# Patient Record
Sex: MALE | Race: WHITE | Employment: OTHER | URBAN - METROPOLITAN AREA
[De-identification: names, ages, dates, MRNs, and addresses within clinical notes are randomized per-mention and may not be internally consistent; named-entity substitution may affect disease eponyms.]

---

## 2019-09-17 ENCOUNTER — HOSPITAL ENCOUNTER (OUTPATIENT)
Age: 69
Discharge: HOME | End: 2019-09-19

## 2019-09-17 PROCEDURE — 88305 TISSUE EXAM BY PATHOLOGIST: CPT

## 2020-06-22 ENCOUNTER — APPOINTMENT (OUTPATIENT)
Dept: GENERAL RADIOLOGY | Age: 70
End: 2020-06-22
Payer: MEDICARE

## 2020-06-22 ENCOUNTER — HOSPITAL ENCOUNTER (OUTPATIENT)
Age: 70
Setting detail: OBSERVATION
Discharge: HOME | End: 2020-06-23
Attending: EMERGENCY MEDICINE | Admitting: INTERNAL MEDICINE
Payer: MEDICARE

## 2020-06-22 PROBLEM — I48.91 A-FIB (HCC): Status: ACTIVE | Noted: 2020-06-22

## 2020-06-22 LAB
ANION GAP SERPL CALCULATED.3IONS-SCNC: 11 MMOL/L (ref 7–16)
APTT: 27.9 SEC (ref 24.5–35.1)
BASOPHILS ABSOLUTE: 0.08 E9/L (ref 0–0.2)
BASOPHILS RELATIVE PERCENT: 0.9 % (ref 0–2)
BUN BLDV-MCNC: 23 MG/DL (ref 8–23)
CALCIUM SERPL-MCNC: 9.3 MG/DL (ref 8.6–10.2)
CHLORIDE BLD-SCNC: 105 MMOL/L (ref 98–107)
CO2: 25 MMOL/L (ref 22–29)
CREAT SERPL-MCNC: 1.3 MG/DL (ref 0.7–1.2)
EOSINOPHILS ABSOLUTE: 0.14 E9/L (ref 0.05–0.5)
EOSINOPHILS RELATIVE PERCENT: 1.5 % (ref 0–6)
GFR AFRICAN AMERICAN: >60
GFR NON-AFRICAN AMERICAN: 55 ML/MIN/1.73
GLUCOSE BLD-MCNC: 108 MG/DL (ref 74–99)
HCT VFR BLD CALC: 50 % (ref 37–54)
HEMOGLOBIN: 17.2 G/DL (ref 12.5–16.5)
IMMATURE GRANULOCYTES #: 0.03 E9/L
IMMATURE GRANULOCYTES %: 0.3 % (ref 0–5)
INR BLD: 1.1
LYMPHOCYTES ABSOLUTE: 1.71 E9/L (ref 1.5–4)
LYMPHOCYTES RELATIVE PERCENT: 18.9 % (ref 20–42)
MCH RBC QN AUTO: 29.9 PG (ref 26–35)
MCHC RBC AUTO-ENTMCNC: 34.4 % (ref 32–34.5)
MCV RBC AUTO: 86.8 FL (ref 80–99.9)
MONOCYTES ABSOLUTE: 0.69 E9/L (ref 0.1–0.95)
MONOCYTES RELATIVE PERCENT: 7.6 % (ref 2–12)
NEUTROPHILS ABSOLUTE: 6.42 E9/L (ref 1.8–7.3)
NEUTROPHILS RELATIVE PERCENT: 70.8 % (ref 43–80)
PDW BLD-RTO: 12.6 FL (ref 11.5–15)
PLATELET # BLD: 321 E9/L (ref 130–450)
PMV BLD AUTO: 10.4 FL (ref 7–12)
POTASSIUM REFLEX MAGNESIUM: 4.2 MMOL/L (ref 3.5–5)
PRO-BNP: 976 PG/ML (ref 0–125)
PROTHROMBIN TIME: 12.3 SEC (ref 9.3–12.4)
RBC # BLD: 5.76 E12/L (ref 3.8–5.8)
SODIUM BLD-SCNC: 141 MMOL/L (ref 132–146)
TROPONIN: <0.01 NG/ML (ref 0–0.03)
WBC # BLD: 9.1 E9/L (ref 4.5–11.5)

## 2020-06-22 PROCEDURE — 99285 EMERGENCY DEPT VISIT HI MDM: CPT

## 2020-06-22 PROCEDURE — 83880 ASSAY OF NATRIURETIC PEPTIDE: CPT

## 2020-06-22 PROCEDURE — 6370000000 HC RX 637 (ALT 250 FOR IP): Performed by: INTERNAL MEDICINE

## 2020-06-22 PROCEDURE — 2500000003 HC RX 250 WO HCPCS: Performed by: STUDENT IN AN ORGANIZED HEALTH CARE EDUCATION/TRAINING PROGRAM

## 2020-06-22 PROCEDURE — 6360000002 HC RX W HCPCS: Performed by: EMERGENCY MEDICINE

## 2020-06-22 PROCEDURE — 93005 ELECTROCARDIOGRAM TRACING: CPT | Performed by: EMERGENCY MEDICINE

## 2020-06-22 PROCEDURE — G0378 HOSPITAL OBSERVATION PER HR: HCPCS

## 2020-06-22 PROCEDURE — 71045 X-RAY EXAM CHEST 1 VIEW: CPT

## 2020-06-22 PROCEDURE — 94760 N-INVAS EAR/PLS OXIMETRY 1: CPT

## 2020-06-22 PROCEDURE — 84484 ASSAY OF TROPONIN QUANT: CPT

## 2020-06-22 PROCEDURE — 80048 BASIC METABOLIC PNL TOTAL CA: CPT

## 2020-06-22 PROCEDURE — 85730 THROMBOPLASTIN TIME PARTIAL: CPT

## 2020-06-22 PROCEDURE — 96374 THER/PROPH/DIAG INJ IV PUSH: CPT

## 2020-06-22 PROCEDURE — 85610 PROTHROMBIN TIME: CPT

## 2020-06-22 PROCEDURE — 2580000003 HC RX 258

## 2020-06-22 PROCEDURE — 85025 COMPLETE CBC W/AUTO DIFF WBC: CPT

## 2020-06-22 PROCEDURE — 96372 THER/PROPH/DIAG INJ SC/IM: CPT

## 2020-06-22 RX ORDER — RAMIPRIL 10 MG/1
10 CAPSULE ORAL 2 TIMES DAILY
COMMUNITY

## 2020-06-22 RX ORDER — METOPROLOL SUCCINATE 25 MG/1
25 TABLET, EXTENDED RELEASE ORAL DAILY
Status: DISCONTINUED | OUTPATIENT
Start: 2020-06-22 | End: 2020-06-23 | Stop reason: HOSPADM

## 2020-06-22 RX ORDER — DILTIAZEM HYDROCHLORIDE 5 MG/ML
10 INJECTION INTRAVENOUS ONCE
Status: COMPLETED | OUTPATIENT
Start: 2020-06-22 | End: 2020-06-22

## 2020-06-22 RX ORDER — FLECAINIDE ACETATE 50 MG/1
50 TABLET ORAL 2 TIMES DAILY
Status: DISCONTINUED | OUTPATIENT
Start: 2020-06-22 | End: 2020-06-22

## 2020-06-22 RX ORDER — AMLODIPINE BESYLATE 5 MG/1
5 TABLET ORAL DAILY
COMMUNITY

## 2020-06-22 RX ORDER — METOPROLOL SUCCINATE 25 MG/1
12.5 TABLET, EXTENDED RELEASE ORAL DAILY
Status: ON HOLD | COMMUNITY
End: 2020-06-23 | Stop reason: HOSPADM

## 2020-06-22 RX ORDER — FLECAINIDE ACETATE 100 MG/1
100 TABLET ORAL 2 TIMES DAILY
Status: DISCONTINUED | OUTPATIENT
Start: 2020-06-22 | End: 2020-06-23 | Stop reason: HOSPADM

## 2020-06-22 RX ORDER — FLECAINIDE ACETATE 50 MG/1
50 TABLET ORAL 2 TIMES DAILY
Status: ON HOLD | COMMUNITY
End: 2020-06-23 | Stop reason: HOSPADM

## 2020-06-22 RX ORDER — AMLODIPINE BESYLATE 5 MG/1
5 TABLET ORAL DAILY
Status: DISCONTINUED | OUTPATIENT
Start: 2020-06-22 | End: 2020-06-23 | Stop reason: HOSPADM

## 2020-06-22 RX ORDER — RAMIPRIL 5 MG/1
10 CAPSULE ORAL 2 TIMES DAILY
Status: DISCONTINUED | OUTPATIENT
Start: 2020-06-22 | End: 2020-06-23 | Stop reason: HOSPADM

## 2020-06-22 RX ORDER — SODIUM CHLORIDE 0.9 % (FLUSH) 0.9 %
SYRINGE (ML) INJECTION
Status: COMPLETED
Start: 2020-06-22 | End: 2020-06-22

## 2020-06-22 RX ADMIN — DILTIAZEM HYDROCHLORIDE 10 MG: 5 INJECTION INTRAVENOUS at 09:31

## 2020-06-22 RX ADMIN — ENOXAPARIN SODIUM 90 MG: 100 INJECTION SUBCUTANEOUS at 09:30

## 2020-06-22 RX ADMIN — FLECAINIDE ACETATE 100 MG: 100 TABLET ORAL at 20:28

## 2020-06-22 RX ADMIN — RAMIPRIL 10 MG: 5 CAPSULE ORAL at 19:37

## 2020-06-22 RX ADMIN — SODIUM CHLORIDE, PRESERVATIVE FREE 10 ML: 5 INJECTION INTRAVENOUS at 09:31

## 2020-06-22 ASSESSMENT — ENCOUNTER SYMPTOMS
COUGH: 0
CHOKING: 0
NAUSEA: 0
CHEST TIGHTNESS: 0
SINUS PRESSURE: 0
STRIDOR: 0
EYE PAIN: 0
ABDOMINAL PAIN: 0
SHORTNESS OF BREATH: 0
BACK PAIN: 0
EYE DISCHARGE: 0
ABDOMINAL DISTENTION: 0
VOMITING: 0
DIARRHEA: 0
SORE THROAT: 0
WHEEZING: 0
EYE REDNESS: 0

## 2020-06-22 ASSESSMENT — PAIN SCALES - GENERAL
PAINLEVEL_OUTOF10: 0

## 2020-06-22 NOTE — CONSULTS
Patient previously followed with The [address] [place] [address] and wishes to continue to follow with them. Have asked nursing staff to change cardiology consult to The [address] [place] [address]. [id number] [facility] signing off; please call again if needed.

## 2020-06-22 NOTE — ED PROVIDER NOTES
61-year-old male with a past medical history of A. fib RVR not on anticoagulation, hypertension on 4 medications presents with irregular heart rate. Patient states that for 1 week he has had intermittent heart palpitations. States that \"I feel like I have an irregular rhythm. \". This is not happened to me since 5 years prior where I was \"shocked back into rhythm. \"  Patient states that he was on Xarelto for 1 month after this time. He is not currently on any anticoagulation medication. Today he states that he started feeling nauseous and short of breath. No other associated symptoms. Denies any fevers, chills, nausea, vomiting, chest pain, shortness of breath, lightheadedness, vertigo, trauma, abdominal pain, flank pain, dysuria, hematuria, diarrhea, constipation and new rashes or sores. Review of Systems   Constitutional: Negative for activity change, chills and fever. HENT: Negative for congestion, ear pain, sinus pressure and sore throat. Eyes: Negative for pain, discharge and redness. Respiratory: Negative for cough, choking, chest tightness, shortness of breath, wheezing and stridor. Cardiovascular: Positive for palpitations. Negative for chest pain and leg swelling. Gastrointestinal: Negative for abdominal distention, abdominal pain, diarrhea, nausea and vomiting. Endocrine: Negative for polyphagia and polyuria. Genitourinary: Negative for decreased urine volume, dysuria, frequency and urgency. Musculoskeletal: Negative for arthralgias and back pain. Skin: Negative for rash and wound. Neurological: Negative for dizziness, tremors, seizures, syncope, facial asymmetry, speech difficulty, weakness, light-headedness, numbness and headaches. Hematological: Negative for adenopathy. Does not bruise/bleed easily. Psychiatric/Behavioral: Negative for agitation. All other systems reviewed and are negative. Physical Exam  Vitals signs and nursing note reviewed. oriented to person, place, and time. Cranial Nerves: No cranial nerve deficit. Coordination: Coordination normal.   Psychiatric:         Mood and Affect: Mood normal.         Behavior: Behavior normal.          Procedures     MDM  Number of Diagnoses or Management Options  Diagnosis management comments: 73-year-old male with a past medical year hypertension on 4 different medications presents with complaints of irregular heartbeat. Patient states that he has had previous episodes of A. fib RVR. States that \"they have never lasted this long. \"  Patient was in A. fib RVR in the department. He was given 10 mg of Cardizem with some relief. His heart rate prior to Cardizem was in the 130s now it is in the 90s. Patient still not complaining of any chest pain or shortness of breath. Patient was given Cardizem, Lovenox, and aspirin during evaluation. He will will be admitted to the hospital for his A. fib RVR. Patient was informed of all the results of his evaluation. Patient is agreeable with plan to stay. He has remained hemodynamically stable, throughout the entire stay. He has no new complaints. Dr. Jason Smith will admit patient to telemetry. ED Course as of Jun 22 1759 Mon Jun 22, 2020   [id number]  11/27/2014   Findings      Left Ventricle   Left ventricular size is grossly normal   LVEF 55-60%   no significant valve abnormalites   Left atrium and ADEBAYO without smoke or clot. Right Ventricle   Normal right ventricle structure and function. Left Atrium   Normal left atrium. Interatrial septum not well visualized. Right Atrium   Normal right atrium. Mitral Valve   Physiologic and/or trace mitral regurgitation is present. No evidence of hemodynamically significant mitral stenosis. Tricuspid Valve   Normal tricuspid valve structure and function. Aortic Valve   Normal aortic valve structure and function. Pulmonic Valve   Normal pulmonic valve structure and function.

## 2020-06-23 VITALS
DIASTOLIC BLOOD PRESSURE: 87 MMHG | RESPIRATION RATE: 18 BRPM | OXYGEN SATURATION: 94 % | WEIGHT: 200 LBS | HEART RATE: 81 BPM | HEIGHT: 68 IN | BODY MASS INDEX: 30.31 KG/M2 | TEMPERATURE: 97.8 F | SYSTOLIC BLOOD PRESSURE: 143 MMHG

## 2020-06-23 PROBLEM — N18.2 CHRONIC KIDNEY DISEASE, STAGE II (MILD): Status: ACTIVE | Noted: 2020-06-23

## 2020-06-23 LAB
EKG ATRIAL RATE: 288 BPM
EKG Q-T INTERVAL: 360 MS
EKG QRS DURATION: 130 MS
EKG QTC CALCULATION (BAZETT): 480 MS
EKG R AXIS: -85 DEGREES
EKG T AXIS: -123 DEGREES
EKG VENTRICULAR RATE: 107 BPM
LV EF: 55 %
LVEF MODALITY: NORMAL

## 2020-06-23 PROCEDURE — 93306 TTE W/DOPPLER COMPLETE: CPT

## 2020-06-23 PROCEDURE — G0378 HOSPITAL OBSERVATION PER HR: HCPCS

## 2020-06-23 PROCEDURE — 6370000000 HC RX 637 (ALT 250 FOR IP): Performed by: INTERNAL MEDICINE

## 2020-06-23 PROCEDURE — 93010 ELECTROCARDIOGRAM REPORT: CPT | Performed by: INTERNAL MEDICINE

## 2020-06-23 RX ORDER — FLECAINIDE ACETATE 100 MG/1
100 TABLET ORAL 2 TIMES DAILY
Qty: 60 TABLET | Refills: 3 | Status: SHIPPED | OUTPATIENT
Start: 2020-06-23

## 2020-06-23 RX ORDER — METOPROLOL SUCCINATE 25 MG/1
25 TABLET, EXTENDED RELEASE ORAL DAILY
Qty: 30 TABLET | Refills: 3 | Status: SHIPPED | OUTPATIENT
Start: 2020-06-24

## 2020-06-23 RX ADMIN — FLECAINIDE ACETATE 100 MG: 100 TABLET ORAL at 08:16

## 2020-06-23 RX ADMIN — RAMIPRIL 10 MG: 5 CAPSULE ORAL at 08:16

## 2020-06-23 RX ADMIN — METOPROLOL SUCCINATE 25 MG: 25 TABLET, EXTENDED RELEASE ORAL at 08:16

## 2020-06-23 RX ADMIN — AMLODIPINE BESYLATE 5 MG: 5 TABLET ORAL at 08:16

## 2020-06-23 ASSESSMENT — PAIN SCALES - GENERAL: PAINLEVEL_OUTOF10: 0

## 2020-06-23 NOTE — H&P
CHIEF COMPLAINT:  AF      HISTORY OF PRESENT ILLNESS:      The patient is a 81 y.o. male patient of Dr Darrin Jack who presents with the above. Remote hx of AF and cardioversion approx 2014  Basically, he has avoided CV f/u(his choice) and has done well. Now, with 1 week hx of AF he acknowledges. Past Medical History:    Past Medical History:   Diagnosis Date    Hypertension        Past Surgical History:    Past Surgical History:   Procedure Laterality Date    CARDIOVERSION  12/1/2014    TONSILLECTOMY         Medications Prior to Admission:    Medications Prior to Admission: ramipril (ALTACE) 10 MG capsule, Take 10 mg by mouth 2 times daily  metoprolol succinate (TOPROL XL) 25 MG extended release tablet, Take 12.5 mg by mouth daily  flecainide (TAMBOCOR) 50 MG tablet, Take 50 mg by mouth 2 times daily  amLODIPine (NORVASC) 5 MG tablet, Take 5 mg by mouth daily  aspirin 325 MG EC tablet, Take 325 mg by mouth daily Has been taking daily for past few days    Allergies:    Patient has no known allergies. Social History:    reports that he has never smoked. He has never used smokeless tobacco. He reports current alcohol use. He reports that he does not use drugs. Family History:   family history includes Colon Cancer in his father; Other in his mother. REVIEW OF SYSTEMS:  As above in the HPI, otherwise negative    PHYSICAL EXAM:    Vitals:  BP (!) 144/88   Pulse 96   Temp 97.4 °F (36.3 °C) (Infrared)   Resp 18   Ht 5' 8\" (1.727 m)   Wt 200 lb (90.7 kg)   SpO2 96%   BMI 30.41 kg/m²     General:  Awake, alert, oriented X 3. Well developed, well nourished, well groomed. No apparent distress. HEENT:  Normocephalic, atraumatic. Pupils equal, round, reactive to light. No scleral icterus. No conjunctival injection. Normal lips, teeth, and gums. No nasal discharge.   Neck:  Supple  Heart:  IRRR, no murmurs, gallops, rubs  Lungs:  CTA bilaterally, bilat symmetrical expansion, no wheeze, rales, or

## 2020-06-23 NOTE — CONSULTS
[facility] [address] [place] [address]    Name: Maxine Myles    Age: 81 y.o. Date of Admission: 6/22/2020  8:32 AM    Date of Service: 6/23/2020    Reason for Consultation: atrial fibrillation    Referring Physician: Dr Shala Bains  Primary Care Physician: Jozef Flanagan MD    History of Present Illness: The patient is a 81y.o. year old male with remote CV 2014 for symptomatic atrial fibrillation, last seen 2017- has been on flecainide 50 bid for rhythm suppression, now presenting for recurrent afib which he believes has been going on several days. Much less symptomatic , just felt a little more fatigue with physical labor. No palpitations lightheadedness, chest pain, orthopnea ,PND or dyspnea with normal activities. Has been reluctant to continue [facility] [place] [place] [facility]nd [facility] asa frequently. Past Medical History:   Past Medical History:   Diagnosis Date    Hypertension        Review of Systems:   Constitutional: No fever, chills, sweats  Cardiac: As per HPI  Pulmonary: No cough, wheeze, hemoptysis  HEENT: No visual disturbances, difficult swallowing  GI: No nausea, vomiting, diarrhea, abdominal pain, rectal bleeding  : No dysuria or hematuria  Endocrine: No excessive thirst, heat or cold intolerance. Musculoskeletal: No joint pain or muscle aches.  No claudication  Skin: No skin breakdown or rashes  Neuro: No headache, confusion, or seizures  Psych: No depression, anxiety    Family History:  Family History   Problem Relation Age of Onset    Other Mother         poor circulation     Colon Cancer Father        Social History:  Social History     Socioeconomic History    Marital status:      Spouse name: Not on file    Number of children: Not on file    Years of education: Not on file    Highest education level: Not on file   Occupational History    Not on file   Social Needs    Financial resource strain: Not on file    Food insecurity     Worry: Not on file     Inability: Not on file  Transportation needs     Medical: Not on file     Non-medical: Not on file   Tobacco Use    Smoking status: Never Smoker    Smokeless tobacco: Never Used   Substance and Sexual Activity    Alcohol use: Yes     Comment: social only    Drug use: No    Sexual activity: Not on file   Lifestyle    Physical activity     Days per week: Not on file     Minutes per session: Not on file    Stress: Not on file   Relationships    Social connections     Talks on phone: Not on file     Gets together: Not on file     Attends [employer] service: Not on file     Active member of club or organization: Not on file     Attends meetings of clubs or organizations: Not on file     Relationship status: Not on file    Intimate partner violence     Fear of current or ex partner: Not on file     Emotionally abused: Not on file     Physically abused: Not on file     Forced sexual activity: Not on file   Other Topics Concern    Not on file   Social History Narrative    Not on file       Allergies:  No Known Allergies    Home Medications:  Prior to Admission medications    Medication Sig Start Date End Date Taking?  Authorizing Provider   ramipril (ALTACE) 10 MG capsule Take 10 mg by mouth 2 times daily   Yes Historical Provider, MD   metoprolol succinate (TOPROL XL) 25 MG extended release tablet Take 12.5 mg by mouth daily   Yes Historical Provider, MD   flecainide (TAMBOCOR) 50 MG tablet Take 50 mg by mouth 2 times daily   Yes Historical Provider, MD   amLODIPine (NORVASC) 5 MG tablet Take 5 mg by mouth daily   Yes Historical Provider, MD   aspirin 325 MG EC tablet Take 325 mg by mouth daily Has been taking daily for past few days   Yes Historical Provider, MD       Current Medications:  Current Facility-Administered Medications   Medication Dose Route Frequency Provider Last Rate Last Dose    rivaroxaban (XARELTO) tablet 20 mg  20 mg Oral Daily Jessica Paniagua MD        perflutren lipid microspheres (DEFINITY) injection 1.65

## 2020-06-23 NOTE — CARE COORDINATION
CM met with pt & wife to discuss role, anticipated LOS & current plan of care. Reports living in split level with his wife. Is independent & does not use ADs or O2 at home. Is not diabetic ---states he is healthy. No hx BABATUNDE or HHC. Discussed dx, echo & TX plan. States he plans to return home with his wife. Denies having any needs at this time. Declines HHC. Nurse entered room to inform pt he is being discharged. Wife will be transporting pt home.

## 2020-06-23 NOTE — PLAN OF CARE
injury  6/23/2020 1251 by Fernando Mcmillan RN  Outcome: Met This Shift  6/23/2020 0008 by Ling Ribeiro RN  Outcome: Met This Shift

## 2020-07-27 ENCOUNTER — HOSPITAL ENCOUNTER (OUTPATIENT)
Age: 70
Discharge: HOME | End: 2020-07-29
Payer: MEDICARE

## 2020-07-27 PROCEDURE — U0003 INFECTIOUS AGENT DETECTION BY NUCLEIC ACID (DNA OR RNA); SEVERE ACUTE RESPIRATORY SYNDROME CORONAVIRUS 2 (SARS-COV-2) (CORONAVIRUS DISEASE [COVID-19]), AMPLIFIED PROBE TECHNIQUE, MAKING USE OF HIGH THROUGHPUT TECHNOLOGIES AS DESCRIBED BY CMS-2020-01-R: HCPCS

## 2020-07-28 LAB
SARS-COV-2: NOT DETECTED
SOURCE: NORMAL

## 2020-07-30 NOTE — PROGRESS NOTES
Have you been tested for COVID  Yes         Have you been told you were positive for COVID No  Have you had any known exposure to someone that is positive for COVID No  Do you have a cough                   No              Do you have shortness of breath No                 Do you have a sore throat            No                Are you having chills                    No                Are you having muscle aches. No                    Please come to the hospital wearing a mask and have your significant other wear a mask as well. Both of you should check your temperature before leaving to come here,  if it is 100 or higher please call [phone] for instruction.

## 2020-07-30 NOTE — PROGRESS NOTES
[facility] PRE-ADMISSION TESTING INSTRUCTIONS    The Preadmission Testing patient is instructed accordingly using the following criteria (check applicable):    ARRIVAL INSTRUCTIONS:  [x] Parking the day of Surgery is located in the Main Entrance lot. Upon entering the door, make an immediate right to the surgery reception desk    [] [phone] is available Monday through Friday 6 am to 6 pm    [x] Bring photo ID and insurance card    [] Bring in a copy of Living will or Durable Power of  papers. [x] Please be sure to arrange for responsible adult to provide transportation to and from the hospital    [x] Please arrange for responsible adult to be with you for the 24 hour period post procedure due to having anesthesia      GENERAL INSTRUCTIONS:    [x] Nothing by mouth after midnight, including gum, candy, mints or water    [x] You may brush your teeth, but do not swallow any water    [x] Take medications as instructed with 1-2 oz of water    [] Stop herbal supplements and vitamins 5 days prior to procedure    [x] Follow preop dosing of blood thinners per physician instructions    [] Take 1/2 dose of evening insulin, but no insulin after midnight    [] No oral diabetic medications after midnight    [] If diabetic and have low blood sugar or feel symptomatic, take 1-2oz apple juice only    [] Bring inhalers day of surgery    [] Bring C-PAP/ Bi-Pap day of surgery    [] Bring urine specimen day of surgery    [x] Shower or bath with soap, lather and rinse well, AM of Surgery, no lotion, powders or creams to surgical site    [] Follow bowel prep as instructed per surgeon    [x] No tobacco products within 24 hours of surgery     [x] No alcohol or illegal drug use within 24 hours of surgery.     [x] Jewelry, body piercing's, eyeglasses, contact lenses and dentures are not permitted into surgery (bring cases)      [x] Please do not wear any nail polish, make up or hair products on the day of surgery    [x] If not already done, you can expect a call from registration    [x] You can expect a call the business day prior to procedure to notify you if your arrival time changes    [x] If you receive a survey after surgery we would greatly appreciate your comments    [] Parent/guardian of a minor must accompany their child and remain on the premises  the entire time they are under our care     [] Pediatric patients may bring favorite toy, blanket or comfort item with them    [] A caregiver or family member must remain with the patient during their stay if they are mentally handicapped, have dementia, disoriented or unable to use a call light or would be a safety concern if left unattended    [x] Please notify surgeon if you develop any illness between now and time of surgery (cold, cough, sore throat, fever, nausea, vomiting) or any signs of infections  including skin, wounds, and dental.    []  The Outpatient Pharmacy is available to fill your prescription here on your day of surgery, ask your preop nurse for details    [x] Other instructions: wear comfortable clothing; patient instructed to follow all medication instructions from cardiology  EDUCATIONAL MATERIALS PROVIDED:    [] PAT Preoperative Education Packet/Booklet     [] Medication List    [] Fluoroscopy Information Pamphlet    [] Transfusion bracelet applied with instructions    [] Joint replacement video reviewed    [] Shower with soap, lather and rinse well, and use CHG wipes provided the evening before surgery as instructed

## 2020-07-31 ENCOUNTER — ANESTHESIA EVENT (OUTPATIENT)
Dept: INPATIENT UNIT | Age: 70
End: 2020-07-31

## 2020-07-31 ENCOUNTER — ANESTHESIA (OUTPATIENT)
Dept: INPATIENT UNIT | Age: 70
End: 2020-07-31

## 2020-07-31 ENCOUNTER — HOSPITAL ENCOUNTER (OUTPATIENT)
Dept: INPATIENT UNIT | Age: 70
Setting detail: OUTPATIENT SURGERY
Discharge: HOME | End: 2020-07-31
Payer: MEDICARE

## 2020-07-31 VITALS
OXYGEN SATURATION: 100 % | DIASTOLIC BLOOD PRESSURE: 66 MMHG | BODY MASS INDEX: 28.04 KG/M2 | HEART RATE: 56 BPM | SYSTOLIC BLOOD PRESSURE: 103 MMHG | HEIGHT: 68 IN | WEIGHT: 185 LBS | RESPIRATION RATE: 16 BRPM

## 2020-07-31 VITALS
DIASTOLIC BLOOD PRESSURE: 58 MMHG | OXYGEN SATURATION: 99 % | RESPIRATION RATE: 19 BRPM | SYSTOLIC BLOOD PRESSURE: 83 MMHG

## 2020-07-31 PROCEDURE — 93005 ELECTROCARDIOGRAM TRACING: CPT

## 2020-07-31 PROCEDURE — 3700000001 HC ADD 15 MINUTES (ANESTHESIA)

## 2020-07-31 PROCEDURE — 6360000002 HC RX W HCPCS: Performed by: NURSE ANESTHETIST, CERTIFIED REGISTERED

## 2020-07-31 PROCEDURE — 7100000011 HC PHASE II RECOVERY - ADDTL 15 MIN

## 2020-07-31 PROCEDURE — 3700000000 HC ANESTHESIA ATTENDED CARE

## 2020-07-31 PROCEDURE — 92960 CARDIOVERSION ELECTRIC EXT: CPT

## 2020-07-31 PROCEDURE — 2580000003 HC RX 258: Performed by: NURSE ANESTHETIST, CERTIFIED REGISTERED

## 2020-07-31 PROCEDURE — 7100000010 HC PHASE II RECOVERY - FIRST 15 MIN

## 2020-07-31 RX ORDER — PROPOFOL 10 MG/ML
INJECTION, EMULSION INTRAVENOUS PRN
Status: DISCONTINUED | OUTPATIENT
Start: 2020-07-31 | End: 2020-07-31 | Stop reason: SDUPTHER

## 2020-07-31 RX ORDER — SODIUM CHLORIDE 9 MG/ML
INJECTION, SOLUTION INTRAVENOUS CONTINUOUS PRN
Status: DISCONTINUED | OUTPATIENT
Start: 2020-07-31 | End: 2020-07-31 | Stop reason: SDUPTHER

## 2020-07-31 RX ADMIN — PROPOFOL 100 MG: 10 INJECTION, EMULSION INTRAVENOUS at 09:52

## 2020-07-31 RX ADMIN — SODIUM CHLORIDE: 9 INJECTION, SOLUTION INTRAVENOUS at 09:46

## 2020-07-31 ASSESSMENT — PAIN SCALES - GENERAL
PAINLEVEL_OUTOF10: 0
PAINLEVEL_OUTOF10: 0

## 2020-07-31 ASSESSMENT — PAIN - FUNCTIONAL ASSESSMENT: PAIN_FUNCTIONAL_ASSESSMENT: 0-10

## 2020-07-31 NOTE — ANESTHESIA PRE PROCEDURE
Department of Anesthesiology  Preprocedure Note       Name:  Marion Archer. Age:  81 y.o.  :  1950                                          MRN:  [id number]         Date:  2020      Surgeon: * No surgeons listed *    Procedure: * No procedures listed *    Medications prior to admission:   Prior to Admission medications    Medication Sig Start Date End Date Taking? Authorizing Provider   flecainide (TAMBOCOR) 100 MG tablet Take 1 tablet by mouth 2 times daily 20  Yes Asmita Vernon MD   rivaroxaban (XARELTO) 20 MG TABS tablet Take 1 tablet by mouth daily 20  Yes Asmita Vernon MD   metoprolol succinate (TOPROL XL) 25 MG extended release tablet Take 1 tablet by mouth daily 20  Yes Asmita Vernon MD   ramipril (ALTACE) 10 MG capsule Take 10 mg by mouth 2 times daily   Yes Historical Provider, MD   amLODIPine (NORVASC) 5 MG tablet Take 5 mg by mouth daily   Yes Historical Provider, MD       Current medications:    Current Outpatient Medications   Medication Sig Dispense Refill    flecainide (TAMBOCOR) 100 MG tablet Take 1 tablet by mouth 2 times daily 60 tablet 3    rivaroxaban (XARELTO) 20 MG TABS tablet Take 1 tablet by mouth daily 30 tablet 2    metoprolol succinate (TOPROL XL) 25 MG extended release tablet Take 1 tablet by mouth daily 30 tablet 3    ramipril (ALTACE) 10 MG capsule Take 10 mg by mouth 2 times daily      amLODIPine (NORVASC) 5 MG tablet Take 5 mg by mouth daily       No current facility-administered medications for this encounter.         Allergies:  No Known Allergies    Problem List:    Patient Active Problem List   Diagnosis Code    AF (atrial fibrillation) (HCC) I48.91    HTN (hypertension) I10    Abnormal EKG R94.31    A-fib ([facility]) I48.91    Chronic kidney disease, stage II (mild) N18.2       Past Medical History:        Diagnosis Date    A-fib ([facility] [address]     follows with Dr. Adriana Kapoor Environmental allergies     Hypertension        Past Surgical History: Procedure Laterality Date    CARDIOVERSION  12/01/2014    x2    TONSILLECTOMY         Social History:    Social History     Tobacco Use    Smoking status: Never Smoker    Smokeless tobacco: Never Used   Substance Use Topics    Alcohol use: Yes     Comment: social only                                Counseling given: Not Answered      Vital Signs (Current):   Vitals:    07/30/20 0909 07/31/20 0816   Pulse:  96   Resp:  16   SpO2:  97%   Weight: 195 lb (88.5 kg) 185 lb (83.9 kg)   Height: 5' 8\" (1.727 m) 5' 8\" (1.727 m)                                              BP Readings from Last 3 Encounters:   06/23/20 (!) 143/87       NPO Status:                                                                                 BMI:   Wt Readings from Last 3 Encounters:   07/31/20 185 lb (83.9 kg)   06/22/20 200 lb (90.7 kg)     Body mass index is 28.13 kg/m². CBC:   Lab Results   Component Value Date    WBC 9.1 06/22/2020    RBC 5.76 06/22/2020    HGB 17.2 06/22/2020    HCT 50.0 06/22/2020    MCV 86.8 06/22/2020    RDW 12.6 06/22/2020     06/22/2020       CMP:   Lab Results   Component Value Date     06/22/2020    K 4.2 06/22/2020     06/22/2020    CO2 25 06/22/2020    BUN 23 06/22/2020    CREATININE 1.3 06/22/2020    GFRAA >60 06/22/2020    LABGLOM 55 06/22/2020    GLUCOSE 108 06/22/2020    CALCIUM 9.3 06/22/2020       POC Tests: No results for input(s): POCGLU, POCNA, POCK, POCCL, POCBUN, POCHEMO, POCHCT in the last 72 hours.     Coags:   Lab Results   Component Value Date    PROTIME 12.3 06/22/2020    INR 1.1 06/22/2020    APTT 27.9 06/22/2020       HCG (If Applicable): No results found for: PREGTESTUR, PREGSERUM, HCG, HCGQUANT     ABGs: No results found for: PHART, PO2ART, IBC5XPR, JXQ5HXA, BEART, L7XPABXE     Type & Screen (If Applicable):  No results found for: LABABO, LABRH    Drug/Infectious Status (If Applicable):  No results found for: HIV, HEPCAB    COVID-19 Screening (If Applicable):

## 2020-07-31 NOTE — PROCEDURES
CARDIOVERSION REPORT     CARDIOLOGIST: Prieto Rodriguez M.D. REFERRING PHYSICIAN: Dr. Kait Casarez:  Elective DC Electrical Cardioversion    CURRENT MEDICATIONS PRIOR TO ENCOUNTER:  Current Outpatient Medications   Medication Sig Dispense Refill    flecainide (TAMBOCOR) 100 MG tablet Take 1 tablet by mouth 2 times daily 60 tablet 3    rivaroxaban (XARELTO) 20 MG TABS tablet Take 1 tablet by mouth daily 30 tablet 2    metoprolol succinate (TOPROL XL) 25 MG extended release tablet Take 1 tablet by mouth daily 30 tablet 3    ramipril (ALTACE) 10 MG capsule Take 10 mg by mouth 2 times daily      amLODIPine (NORVASC) 5 MG tablet Take 5 mg by mouth daily       No current facility-administered medications for this encounter. HISTORY: 68-year-old male with recurrent, symptomatic and persistent atrial flutter. Metoprolol XL and flecainide doses recently increased. Has been on oral anticoagulant therapy for over 1 month. Tentatively scheduled for evaluation for RF ablation      Consent:  Patient agrees to the above procedure after being advised of risks, benefits and alternatives. Risks were outlined including but not limited to death, stroke, respiratory failure, need for intubation, need for ACLS/pacemaker,  chest wall and skin discomfort. Procedure:  Patient was taken to the procedure room where intravenous access was established, and supplemental oxygen was provided. Anterior-posterior patches were affixed to the chest wall and SPO2 monitor/intermittent blood pressure recording device was initiated. Propofol administered per anesthesia and following adequate deep sedation, a single 150 J synchronized biphasic shock converted atrial flutter to sinus bradycardia and he recovered uneventfully. Will decrease metoprolol XL to 25 mg daily however all other meds remain unchanged. Follow-up appointment in August.    RESULTS:    COMPLICATIONS: None.   Patient will be discharged to home after being advised of skin care, diet, activity and medications. A follow-up appointment will be arranged.     CONCLUSION:  Successful electrical cardioversion of atrial flutter    Electronically signed by Yehuda Santiago MD on 7/31/2020 at 10:08 AM

## 2020-07-31 NOTE — ANESTHESIA POSTPROCEDURE EVALUATION
Department of Anesthesiology  Postprocedure Note    Patient: Brandi Mooney. MRN: [id number]  YOB: 1950  Date of evaluation: 7/31/2020  Time:  3:31 PM     Procedure Summary     Date:  07/31/20 Room / Location:  [facility] Stage I    Anesthesia Start:  0946 Anesthesia Stop:  1007    Procedure:  CARDIOVERSION Diagnosis:      Scheduled Providers:   Responsible Provider:  Jett Davis MD    Anesthesia Type:  MAC ASA Status:  3          Anesthesia Type: MAC    Yolanda Phase I:      Yolanda Phase II: Yolanda Score: 10    Last vitals: Reviewed and per EMR flowsheets.        Anesthesia Post Evaluation    Patient location during evaluation: PACU  Level of consciousness: awake  Airway patency: patent  Nausea & Vomiting: no nausea and no vomiting  Complications: no  Cardiovascular status: hemodynamically stable  Respiratory status: acceptable

## 2020-08-03 LAB
EKG ATRIAL RATE: 52 BPM
EKG P AXIS: 60 DEGREES
EKG P-R INTERVAL: 288 MS
EKG Q-T INTERVAL: 502 MS
EKG QRS DURATION: 114 MS
EKG QTC CALCULATION (BAZETT): 466 MS
EKG R AXIS: -75 DEGREES
EKG T AXIS: -127 DEGREES
EKG VENTRICULAR RATE: 52 BPM

## 2021-03-10 ENCOUNTER — APPOINTMENT (OUTPATIENT)
Dept: CT IMAGING | Age: 71
End: 2021-03-10
Payer: MEDICARE

## 2021-03-10 ENCOUNTER — HOSPITAL ENCOUNTER (EMERGENCY)
Age: 71
Discharge: HOME | End: 2021-03-10
Attending: EMERGENCY MEDICINE
Payer: MEDICARE

## 2021-03-10 VITALS
DIASTOLIC BLOOD PRESSURE: 84 MMHG | BODY MASS INDEX: 29.55 KG/M2 | TEMPERATURE: 98.7 F | HEIGHT: 68 IN | SYSTOLIC BLOOD PRESSURE: 153 MMHG | WEIGHT: 195 LBS | HEART RATE: 66 BPM | RESPIRATION RATE: 16 BRPM | OXYGEN SATURATION: 96 %

## 2021-03-10 DIAGNOSIS — R31.9 URINARY TRACT INFECTION WITH HEMATURIA, SITE UNSPECIFIED: ICD-10-CM

## 2021-03-10 DIAGNOSIS — N20.0 KIDNEY STONE: Primary | ICD-10-CM

## 2021-03-10 DIAGNOSIS — N39.0 URINARY TRACT INFECTION WITH HEMATURIA, SITE UNSPECIFIED: ICD-10-CM

## 2021-03-10 DIAGNOSIS — N23 RENAL COLIC: ICD-10-CM

## 2021-03-10 LAB
ALBUMIN SERPL-MCNC: 4.4 G/DL (ref 3.5–5.2)
ALP BLD-CCNC: 98 U/L (ref 40–129)
ALT SERPL-CCNC: 12 U/L (ref 0–40)
ANION GAP SERPL CALCULATED.3IONS-SCNC: 8 MMOL/L (ref 7–16)
AST SERPL-CCNC: 16 U/L (ref 0–39)
BACTERIA: ABNORMAL /HPF
BASOPHILS ABSOLUTE: 0.06 E9/L (ref 0–0.2)
BASOPHILS RELATIVE PERCENT: 0.9 % (ref 0–2)
BILIRUB SERPL-MCNC: 0.4 MG/DL (ref 0–1.2)
BILIRUBIN URINE: ABNORMAL
BLOOD, URINE: ABNORMAL
BUN BLDV-MCNC: 23 MG/DL (ref 8–23)
CALCIUM SERPL-MCNC: 9.5 MG/DL (ref 8.6–10.2)
CHLORIDE BLD-SCNC: 106 MMOL/L (ref 98–107)
CLARITY: ABNORMAL
CO2: 29 MMOL/L (ref 22–29)
COLOR: ABNORMAL
CREAT SERPL-MCNC: 1.3 MG/DL (ref 0.7–1.2)
EOSINOPHILS ABSOLUTE: 0.09 E9/L (ref 0.05–0.5)
EOSINOPHILS RELATIVE PERCENT: 1.3 % (ref 0–6)
GFR AFRICAN AMERICAN: >60
GFR NON-AFRICAN AMERICAN: 55 ML/MIN/1.73
GLUCOSE BLD-MCNC: 122 MG/DL (ref 74–99)
GLUCOSE URINE: NEGATIVE MG/DL
HCT VFR BLD CALC: 46 % (ref 37–54)
HEMOGLOBIN: 15.2 G/DL (ref 12.5–16.5)
IMMATURE GRANULOCYTES #: 0.02 E9/L
IMMATURE GRANULOCYTES %: 0.3 % (ref 0–5)
KETONES, URINE: ABNORMAL MG/DL
LEUKOCYTE ESTERASE, URINE: ABNORMAL
LYMPHOCYTES ABSOLUTE: 0.95 E9/L (ref 1.5–4)
LYMPHOCYTES RELATIVE PERCENT: 13.6 % (ref 20–42)
MCH RBC QN AUTO: 29.3 PG (ref 26–35)
MCHC RBC AUTO-ENTMCNC: 33 % (ref 32–34.5)
MCV RBC AUTO: 88.6 FL (ref 80–99.9)
MONOCYTES ABSOLUTE: 0.59 E9/L (ref 0.1–0.95)
MONOCYTES RELATIVE PERCENT: 8.4 % (ref 2–12)
NEUTROPHILS ABSOLUTE: 5.28 E9/L (ref 1.8–7.3)
NEUTROPHILS RELATIVE PERCENT: 75.5 % (ref 43–80)
NITRITE, URINE: POSITIVE
PDW BLD-RTO: 12.7 FL (ref 11.5–15)
PH UA: 5 (ref 5–9)
PLATELET # BLD: 276 E9/L (ref 130–450)
PMV BLD AUTO: 11 FL (ref 7–12)
POTASSIUM SERPL-SCNC: 5.1 MMOL/L (ref 3.5–5)
PROTEIN UA: >=300 MG/DL
RBC # BLD: 5.19 E12/L (ref 3.8–5.8)
RBC UA: >20 /HPF (ref 0–2)
SODIUM BLD-SCNC: 143 MMOL/L (ref 132–146)
SPECIFIC GRAVITY UA: >=1.03 (ref 1–1.03)
TOTAL PROTEIN: 7.1 G/DL (ref 6.4–8.3)
UROBILINOGEN, URINE: 1 E.U./DL
WBC # BLD: 7 E9/L (ref 4.5–11.5)
WBC UA: ABNORMAL /HPF (ref 0–5)

## 2021-03-10 PROCEDURE — 85025 COMPLETE CBC W/AUTO DIFF WBC: CPT

## 2021-03-10 PROCEDURE — 80053 COMPREHEN METABOLIC PANEL: CPT

## 2021-03-10 PROCEDURE — 81001 URINALYSIS AUTO W/SCOPE: CPT

## 2021-03-10 PROCEDURE — 99285 EMERGENCY DEPT VISIT HI MDM: CPT

## 2021-03-10 PROCEDURE — 2580000003 HC RX 258: Performed by: NURSE PRACTITIONER

## 2021-03-10 PROCEDURE — 74176 CT ABD & PELVIS W/O CONTRAST: CPT

## 2021-03-10 PROCEDURE — 6370000000 HC RX 637 (ALT 250 FOR IP): Performed by: NURSE PRACTITIONER

## 2021-03-10 RX ORDER — TAMSULOSIN HYDROCHLORIDE 0.4 MG/1
0.4 CAPSULE ORAL DAILY
Qty: 14 CAPSULE | Refills: 0 | Status: SHIPPED | OUTPATIENT
Start: 2021-03-10 | End: 2021-03-24

## 2021-03-10 RX ORDER — 0.9 % SODIUM CHLORIDE 0.9 %
1000 INTRAVENOUS SOLUTION INTRAVENOUS ONCE
Status: COMPLETED | OUTPATIENT
Start: 2021-03-10 | End: 2021-03-10

## 2021-03-10 RX ORDER — CEPHALEXIN 500 MG/1
500 CAPSULE ORAL 2 TIMES DAILY
Qty: 14 CAPSULE | Refills: 0 | Status: SHIPPED | OUTPATIENT
Start: 2021-03-10 | End: 2021-03-17

## 2021-03-10 RX ORDER — CEPHALEXIN 500 MG/1
500 CAPSULE ORAL ONCE
Status: COMPLETED | OUTPATIENT
Start: 2021-03-10 | End: 2021-03-10

## 2021-03-10 RX ORDER — HYDROCODONE BITARTRATE AND ACETAMINOPHEN 5; 325 MG/1; MG/1
1 TABLET ORAL EVERY 6 HOURS PRN
Qty: 12 TABLET | Refills: 0 | Status: SHIPPED | OUTPATIENT
Start: 2021-03-10 | End: 2021-03-13

## 2021-03-10 RX ORDER — HYDROCODONE BITARTRATE AND ACETAMINOPHEN 5; 325 MG/1; MG/1
1 TABLET ORAL ONCE
Status: COMPLETED | OUTPATIENT
Start: 2021-03-10 | End: 2021-03-10

## 2021-03-10 RX ADMIN — SODIUM CHLORIDE 1000 ML: 9 INJECTION, SOLUTION INTRAVENOUS at 19:38

## 2021-03-10 RX ADMIN — HYDROCODONE BITARTRATE AND ACETAMINOPHEN 1 TABLET: 5; 325 TABLET ORAL at 20:51

## 2021-03-10 RX ADMIN — CEPHALEXIN 500 MG: 500 CAPSULE ORAL at 20:51

## 2021-03-10 ASSESSMENT — PAIN SCALES - GENERAL
PAINLEVEL_OUTOF10: 2
PAINLEVEL_OUTOF10: 0

## 2021-03-10 ASSESSMENT — PAIN DESCRIPTION - LOCATION: LOCATION: FLANK

## 2021-03-10 ASSESSMENT — PAIN DESCRIPTION - ORIENTATION: ORIENTATION: LEFT

## 2021-03-10 ASSESSMENT — PAIN DESCRIPTION - PAIN TYPE: TYPE: ACUTE PAIN

## 2021-03-10 ASSESSMENT — PAIN DESCRIPTION - FREQUENCY: FREQUENCY: CONTINUOUS

## 2021-03-11 NOTE — ED NOTES
Blood work and urine obtained and sent to lab at this time. Extra blue top was sent to lab as well.       Yari Leggett RN  03/10/21 1942

## 2021-03-11 NOTE — ED NOTES
Discharge instructions given and reviewed with patient and wife. RX given. Instructed to follow up with Juliocesar Vargas and Hector Mohamud. Questions and concerns addressed. Pt departed ED ambulatory in no apparent distress with wife. Personal belongings taken.      Garry Pricthard RN  03/10/21 8964

## 2021-03-11 NOTE — ED PROVIDER NOTES
development consistent with age. HEENT:  NC/NT. Airway patent. Neck:  Normal ROM. Supple. Respiratory:  Clear to auscultation and breath sounds equal.  CV:  Regular rate and rhythm, normal heart sounds, without pathological murmurs, ectopy, gallops, or rubs. GI:  normal appearing, non-distended with no visible hernias. Bowel sounds: normal bowel sounds. Tenderness: No abdominal tenderness, guarding, rebound, rigidity or pulsatile mass. .        Liver: non-tender. Spleen:  non-tender and non-palpable. /Back: CVA Tenderness: No.  Integument:  Normal turgor. Warm, dry, without visible rash, unless noted elsewhere. Lymphatics: No lymphangitis or adenopathy noted. Neurological:  Oriented. Motor functions intact.     Lab / Imaging Results   (All laboratory and radiology results have been personally reviewed by myself)  Labs:  Results for orders placed or performed during the hospital encounter of 03/10/21   Comprehensive Metabolic Panel   Result Value Ref Range    Sodium 143 132 - 146 mmol/L    Potassium 5.1 (H) 3.5 - 5.0 mmol/L    Chloride 106 98 - 107 mmol/L    CO2 29 22 - 29 mmol/L    Anion Gap 8 7 - 16 mmol/L    Glucose 122 (H) 74 - 99 mg/dL    BUN 23 8 - 23 mg/dL    CREATININE 1.3 (H) 0.7 - 1.2 mg/dL    GFR Non-African American 55 >=60 mL/min/1.73    GFR African American >60     Calcium 9.5 8.6 - 10.2 mg/dL    Total Protein 7.1 6.4 - 8.3 g/dL    Albumin 4.4 3.5 - 5.2 g/dL    Total Bilirubin 0.4 0.0 - 1.2 mg/dL    Alkaline Phosphatase 98 40 - 129 U/L    ALT 12 0 - 40 U/L    AST 16 0 - 39 U/L   CBC Auto Differential   Result Value Ref Range    WBC 7.0 4.5 - 11.5 E9/L    RBC 5.19 3.80 - 5.80 E12/L    Hemoglobin 15.2 12.5 - 16.5 g/dL    Hematocrit 46.0 37.0 - 54.0 %    MCV 88.6 80.0 - 99.9 fL    MCH 29.3 26.0 - 35.0 pg    MCHC 33.0 32.0 - 34.5 %    RDW 12.7 11.5 - 15.0 fL    Platelets 975 889 - 747 E9/L    MPV 11.0 7.0 - 12.0 fL    Neutrophils % 75.5 43.0 - 80.0 %    Immature Granulocytes % 0.3 0.0 - 5.0 %    Lymphocytes % 13.6 (L) 20.0 - 42.0 %    Monocytes % 8.4 2.0 - 12.0 %    Eosinophils % 1.3 0.0 - 6.0 %    Basophils % 0.9 0.0 - 2.0 %    Neutrophils Absolute 5.28 1.80 - 7.30 E9/L    Immature Granulocytes # 0.02 E9/L    Lymphocytes Absolute 0.95 (L) 1.50 - 4.00 E9/L    Monocytes Absolute 0.59 0.10 - 0.95 E9/L    Eosinophils Absolute 0.09 0.05 - 0.50 E9/L    Basophils Absolute 0.06 0.00 - 0.20 E9/L   Urinalysis with Microscopic   Result Value Ref Range    Color, UA BLOODY Straw/Yellow    Clarity, UA CLOUDY (A) Clear    Glucose, Ur Negative Negative mg/dL    Bilirubin Urine SMALL (A) Negative    Ketones, Urine TRACE (A) Negative mg/dL    Specific Gravity, UA >=1.030 1.005 - 1.030    Blood, Urine LARGE (A) Negative    pH, UA 5.0 5.0 - 9.0    Protein, UA >=300 (A) Negative mg/dL    Urobilinogen, Urine 1.0 <2.0 E.U./dL    Nitrite, Urine POSITIVE (A) Negative    Leukocyte Esterase, Urine TRACE (A) Negative    WBC, UA NONE 0 - 5 /HPF    RBC, UA >20 0 - 2 /HPF    Bacteria, UA FEW (A) None Seen /HPF       Imaging: All Radiology results interpreted by Radiologist unless otherwise noted. CT ABDOMEN PELVIS WO CONTRAST Additional Contrast? None   Final Result   Multiple (1 on the right and 2 on the left) 1-2 mm punctate nonobstructing   bilateral renal stones. 3 mm calcification along the course of the left distal ureter with fullness   of the left renal collecting system, highly concerning for partially   obstructing left distal ureteral stone. Colonic diverticulosis with no evidence of diverticulitis.            ED Course / Medical Decision Making     Medications   iopamidol (ISOVUE-370) 76 % injection 75 mL (75 mLs Intravenous Not Given 3/10/21 2009)   0.9 % sodium chloride bolus (0 mLs Intravenous Stopped 3/10/21 2038)   cephALEXin (KEFLEX) capsule 500 mg (500 mg Oral Given 3/10/21 2051)   HYDROcodone-acetaminophen (NORCO) 5-325 MG per tablet 1 tablet (1 tablet Oral Given 3/10/21

## 2021-03-11 NOTE — ED NOTES
Assumed care of patient. Pt lying in bed in no apparent distress. Wife at bedside.      Radha Dickens RN  03/10/21 204

## 2024-10-23 ENCOUNTER — APPOINTMENT (OUTPATIENT)
Dept: GENERAL RADIOLOGY | Age: 74
End: 2024-10-23
Payer: MEDICARE

## 2024-10-23 ENCOUNTER — HOSPITAL ENCOUNTER (OUTPATIENT)
Age: 74
Setting detail: OBSERVATION
Discharge: HOME | End: 2024-10-24
Attending: EMERGENCY MEDICINE | Admitting: INTERNAL MEDICINE
Payer: MEDICARE

## 2024-10-23 DIAGNOSIS — I48.4 ATYPICAL ATRIAL FLUTTER (HCC): Primary | ICD-10-CM

## 2024-10-23 PROBLEM — I48.92 ATRIAL FLUTTER WITH RAPID VENTRICULAR RESPONSE (HCC): Status: ACTIVE | Noted: 2024-10-23

## 2024-10-23 LAB
ALBUMIN SERPL-MCNC: 4.1 G/DL (ref 3.5–5.2)
ALP SERPL-CCNC: 95 U/L (ref 40–129)
ALT SERPL-CCNC: 27 U/L (ref 0–40)
ANION GAP SERPL CALCULATED.3IONS-SCNC: 9 MMOL/L (ref 7–16)
AST SERPL-CCNC: 29 U/L (ref 0–39)
BASOPHILS # BLD: 0.05 K/UL (ref 0–0.2)
BASOPHILS NFR BLD: 1 % (ref 0–2)
BILIRUB DIRECT SERPL-MCNC: 0.2 MG/DL (ref 0–0.3)
BILIRUB INDIRECT SERPL-MCNC: 0.7 MG/DL (ref 0–1)
BILIRUB SERPL-MCNC: 0.9 MG/DL (ref 0–1.2)
BNP SERPL-MCNC: 3587 PG/ML (ref 0–125)
BUN SERPL-MCNC: 16 MG/DL (ref 6–23)
CALCIUM SERPL-MCNC: 9.4 MG/DL (ref 8.6–10.2)
CHLORIDE SERPL-SCNC: 103 MMOL/L (ref 98–107)
CO2 SERPL-SCNC: 26 MMOL/L (ref 22–29)
CREAT SERPL-MCNC: 1.4 MG/DL (ref 0.7–1.2)
EOSINOPHIL # BLD: 0.02 K/UL (ref 0.05–0.5)
EOSINOPHILS RELATIVE PERCENT: 0 % (ref 0–6)
ERYTHROCYTE [DISTWIDTH] IN BLOOD BY AUTOMATED COUNT: 12.7 % (ref 11.5–15)
GFR, ESTIMATED: 53 ML/MIN/1.73M2
GLUCOSE SERPL-MCNC: 147 MG/DL (ref 74–99)
HCT VFR BLD AUTO: 47.7 % (ref 37–54)
HGB BLD-MCNC: 15.6 G/DL (ref 12.5–16.5)
IMM GRANULOCYTES # BLD AUTO: 0.03 K/UL (ref 0–0.58)
IMM GRANULOCYTES NFR BLD: 1 % (ref 0–5)
LYMPHOCYTES NFR BLD: 0.84 K/UL (ref 1.5–4)
LYMPHOCYTES RELATIVE PERCENT: 13 % (ref 20–42)
MAGNESIUM SERPL-MCNC: 2 MG/DL (ref 1.6–2.6)
MCH RBC QN AUTO: 29.3 PG (ref 26–35)
MCHC RBC AUTO-ENTMCNC: 32.7 G/DL (ref 32–34.5)
MCV RBC AUTO: 89.5 FL (ref 80–99.9)
MONOCYTES NFR BLD: 0.49 K/UL (ref 0.1–0.95)
MONOCYTES NFR BLD: 8 % (ref 2–12)
NEUTROPHILS NFR BLD: 78 % (ref 43–80)
NEUTS SEG NFR BLD: 5.03 K/UL (ref 1.8–7.3)
PLATELET # BLD AUTO: 263 K/UL (ref 130–450)
PMV BLD AUTO: 10.9 FL (ref 7–12)
POTASSIUM SERPL-SCNC: 4.4 MMOL/L (ref 3.5–5)
PROT SERPL-MCNC: 6.5 G/DL (ref 6.4–8.3)
RBC # BLD AUTO: 5.33 M/UL (ref 3.8–5.8)
SODIUM SERPL-SCNC: 138 MMOL/L (ref 132–146)
TROPONIN I SERPL HS-MCNC: 16 NG/L (ref 0–11)
TROPONIN I SERPL HS-MCNC: 17 NG/L (ref 0–11)
TROPONIN I SERPL HS-MCNC: 28 NG/L (ref 0–11)
WBC OTHER # BLD: 6.5 K/UL (ref 4.5–11.5)

## 2024-10-23 PROCEDURE — 83735 ASSAY OF MAGNESIUM: CPT

## 2024-10-23 PROCEDURE — 2500000003 HC RX 250 WO HCPCS: Performed by: INTERNAL MEDICINE

## 2024-10-23 PROCEDURE — 93005 ELECTROCARDIOGRAM TRACING: CPT

## 2024-10-23 PROCEDURE — 71045 X-RAY EXAM CHEST 1 VIEW: CPT

## 2024-10-23 PROCEDURE — 93005 ELECTROCARDIOGRAM TRACING: CPT | Performed by: NURSE PRACTITIONER

## 2024-10-23 PROCEDURE — 6370000000 HC RX 637 (ALT 250 FOR IP): Performed by: INTERNAL MEDICINE

## 2024-10-23 PROCEDURE — 2580000003 HC RX 258: Performed by: INTERNAL MEDICINE

## 2024-10-23 PROCEDURE — G0378 HOSPITAL OBSERVATION PER HR: HCPCS

## 2024-10-23 PROCEDURE — 85025 COMPLETE CBC W/AUTO DIFF WBC: CPT

## 2024-10-23 PROCEDURE — 96365 THER/PROPH/DIAG IV INF INIT: CPT

## 2024-10-23 PROCEDURE — 96366 THER/PROPH/DIAG IV INF ADDON: CPT

## 2024-10-23 PROCEDURE — 84484 ASSAY OF TROPONIN QUANT: CPT

## 2024-10-23 PROCEDURE — 83880 ASSAY OF NATRIURETIC PEPTIDE: CPT

## 2024-10-23 PROCEDURE — 80053 COMPREHEN METABOLIC PANEL: CPT

## 2024-10-23 PROCEDURE — 99285 EMERGENCY DEPT VISIT HI MDM: CPT

## 2024-10-23 PROCEDURE — 2500000003 HC RX 250 WO HCPCS

## 2024-10-23 PROCEDURE — 96374 THER/PROPH/DIAG INJ IV PUSH: CPT

## 2024-10-23 PROCEDURE — 93005 ELECTROCARDIOGRAM TRACING: CPT | Performed by: INTERNAL MEDICINE

## 2024-10-23 PROCEDURE — 6370000000 HC RX 637 (ALT 250 FOR IP)

## 2024-10-23 PROCEDURE — 82248 BILIRUBIN DIRECT: CPT

## 2024-10-23 PROCEDURE — 96376 TX/PRO/DX INJ SAME DRUG ADON: CPT

## 2024-10-23 RX ORDER — ACETAMINOPHEN 325 MG/1
650 TABLET ORAL EVERY 6 HOURS PRN
Status: DISCONTINUED | OUTPATIENT
Start: 2024-10-23 | End: 2024-10-24 | Stop reason: HOSPADM

## 2024-10-23 RX ORDER — SODIUM CHLORIDE 0.9 % (FLUSH) 0.9 %
10 SYRINGE (ML) INJECTION EVERY 12 HOURS SCHEDULED
Status: DISCONTINUED | OUTPATIENT
Start: 2024-10-23 | End: 2024-10-24 | Stop reason: HOSPADM

## 2024-10-23 RX ORDER — ONDANSETRON 4 MG/1
4 TABLET, ORALLY DISINTEGRATING ORAL EVERY 8 HOURS PRN
Status: DISCONTINUED | OUTPATIENT
Start: 2024-10-23 | End: 2024-10-24 | Stop reason: HOSPADM

## 2024-10-23 RX ORDER — ENOXAPARIN SODIUM 100 MG/ML
40 INJECTION SUBCUTANEOUS DAILY
Status: DISCONTINUED | OUTPATIENT
Start: 2024-10-23 | End: 2024-10-23

## 2024-10-23 RX ORDER — SODIUM CHLORIDE 0.9 % (FLUSH) 0.9 %
10 SYRINGE (ML) INJECTION PRN
Status: DISCONTINUED | OUTPATIENT
Start: 2024-10-23 | End: 2024-10-24 | Stop reason: HOSPADM

## 2024-10-23 RX ORDER — ONDANSETRON 2 MG/ML
4 INJECTION INTRAMUSCULAR; INTRAVENOUS EVERY 6 HOURS PRN
Status: DISCONTINUED | OUTPATIENT
Start: 2024-10-23 | End: 2024-10-24 | Stop reason: HOSPADM

## 2024-10-23 RX ORDER — SENNOSIDES A AND B 8.6 MG/1
1 TABLET, FILM COATED ORAL DAILY PRN
Status: DISCONTINUED | OUTPATIENT
Start: 2024-10-23 | End: 2024-10-24 | Stop reason: HOSPADM

## 2024-10-23 RX ORDER — POTASSIUM CHLORIDE 7.45 MG/ML
10 INJECTION INTRAVENOUS PRN
Status: DISCONTINUED | OUTPATIENT
Start: 2024-10-23 | End: 2024-10-24 | Stop reason: HOSPADM

## 2024-10-23 RX ORDER — ROSUVASTATIN CALCIUM 10 MG/1
10 TABLET, COATED ORAL DAILY
COMMUNITY

## 2024-10-23 RX ORDER — MAGNESIUM SULFATE IN WATER 40 MG/ML
2000 INJECTION, SOLUTION INTRAVENOUS PRN
Status: DISCONTINUED | OUTPATIENT
Start: 2024-10-23 | End: 2024-10-24 | Stop reason: HOSPADM

## 2024-10-23 RX ORDER — DILTIAZEM HYDROCHLORIDE 5 MG/ML
10 INJECTION INTRAVENOUS ONCE
Status: COMPLETED | OUTPATIENT
Start: 2024-10-23 | End: 2024-10-23

## 2024-10-23 RX ORDER — LISINOPRIL 20 MG/1
40 TABLET ORAL DAILY
Status: DISCONTINUED | OUTPATIENT
Start: 2024-10-23 | End: 2024-10-24 | Stop reason: HOSPADM

## 2024-10-23 RX ORDER — FLECAINIDE ACETATE 100 MG/1
100 TABLET ORAL 2 TIMES DAILY
Status: DISCONTINUED | OUTPATIENT
Start: 2024-10-23 | End: 2024-10-24 | Stop reason: HOSPADM

## 2024-10-23 RX ORDER — POTASSIUM CHLORIDE 1500 MG/1
40 TABLET, EXTENDED RELEASE ORAL PRN
Status: DISCONTINUED | OUTPATIENT
Start: 2024-10-23 | End: 2024-10-24 | Stop reason: HOSPADM

## 2024-10-23 RX ORDER — ACETAMINOPHEN 650 MG/1
650 SUPPOSITORY RECTAL EVERY 6 HOURS PRN
Status: DISCONTINUED | OUTPATIENT
Start: 2024-10-23 | End: 2024-10-24 | Stop reason: HOSPADM

## 2024-10-23 RX ORDER — SODIUM CHLORIDE 9 MG/ML
INJECTION, SOLUTION INTRAVENOUS PRN
Status: DISCONTINUED | OUTPATIENT
Start: 2024-10-23 | End: 2024-10-24 | Stop reason: HOSPADM

## 2024-10-23 RX ORDER — METOPROLOL SUCCINATE 25 MG/1
25 TABLET, EXTENDED RELEASE ORAL DAILY
Status: DISCONTINUED | OUTPATIENT
Start: 2024-10-23 | End: 2024-10-24 | Stop reason: HOSPADM

## 2024-10-23 RX ORDER — ENOXAPARIN SODIUM 100 MG/ML
1 INJECTION SUBCUTANEOUS ONCE
Status: DISCONTINUED | OUTPATIENT
Start: 2024-10-23 | End: 2024-10-23 | Stop reason: ALTCHOICE

## 2024-10-23 RX ORDER — TAMSULOSIN HYDROCHLORIDE 0.4 MG/1
0.4 CAPSULE ORAL DAILY
Status: DISCONTINUED | OUTPATIENT
Start: 2024-10-23 | End: 2024-10-24 | Stop reason: HOSPADM

## 2024-10-23 RX ORDER — LANOLIN ALCOHOL/MO/W.PET/CERES
3 CREAM (GRAM) TOPICAL NIGHTLY PRN
Status: DISCONTINUED | OUTPATIENT
Start: 2024-10-23 | End: 2024-10-24 | Stop reason: HOSPADM

## 2024-10-23 RX ORDER — METOPROLOL TARTRATE 50 MG
50 TABLET ORAL ONCE
Status: COMPLETED | OUTPATIENT
Start: 2024-10-23 | End: 2024-10-23

## 2024-10-23 RX ADMIN — METOPROLOL SUCCINATE 25 MG: 25 TABLET, EXTENDED RELEASE ORAL at 18:11

## 2024-10-23 RX ADMIN — DILTIAZEM HYDROCHLORIDE 10 MG: 5 INJECTION, SOLUTION INTRAVENOUS at 14:42

## 2024-10-23 RX ADMIN — LISINOPRIL 40 MG: 20 TABLET ORAL at 18:11

## 2024-10-23 RX ADMIN — SODIUM CHLORIDE, PRESERVATIVE FREE 10 ML: 5 INJECTION INTRAVENOUS at 20:48

## 2024-10-23 RX ADMIN — METOPROLOL TARTRATE 50 MG: 50 TABLET, FILM COATED ORAL at 12:33

## 2024-10-23 RX ADMIN — SODIUM CHLORIDE 5 MG/HR: 900 INJECTION, SOLUTION INTRAVENOUS at 18:10

## 2024-10-23 ASSESSMENT — PAIN - FUNCTIONAL ASSESSMENT: PAIN_FUNCTIONAL_ASSESSMENT: NONE - DENIES PAIN

## 2024-10-23 NOTE — PROGRESS NOTES
4 Eyes Skin Assessment     NAME:  Favio Kitchen Jr.  YOB: 1950  MEDICAL RECORD NUMBER:  [id number]    The patient is being assessed for  Admission    I agree that at least one RN has performed a thorough Head to Toe Skin Assessment on the patient. ALL assessment sites listed below have been assessed.      Areas assessed by both nurses:    Head, Face, Ears, Shoulders, Back, Chest, Arms, Elbows, Hands, Sacrum. Buttock, Coccyx, Ischium, Legs. Feet and Heels, and Under Medical Devices         Does the Patient have a Wound? No noted wound(s)       Orion Prevention initiated by RN: No  Wound Care Orders initiated by RN: No    Pressure Injury (Stage 3,4, Unstageable, DTI, NWPT, and Complex wounds) if present, place Wound referral order by RN under : No    New Ostomies, if present place, Ostomy referral order under : No     Nurse 1 eSignature: Electronically signed by Kimberlee Guallpa RN on 10/23/24 at 5:42 PM EDT    **SHARE this note so that the co-signing nurse can place an eSignature**    Nurse 2 eSignature: Electronically signed by Radha Brar RN on 10/23/24 at 6:15 PM EDT

## 2024-10-23 NOTE — ED PROVIDER NOTES
Department of Emergency Medicine     Written by: Lucy Merino MD  Patient Name: Favio Kitchen Jr.  Admit Date: 10/23/2024 11:58 AM  MRN: [id number]                   : 1950    HPI     Chief Complaint   Patient presents with    Hypertension    Tachycardia     125 at home. Had ablation last week.        Favio Kitchen Jr. is a 73 y.o. male that presents to the ED due to concern tachycardia.  He says he has been feeling \"not right\" for the last 2 days.  He had an ablation 2 weeks ago at [facility] for atrial fibrillation.  He was previously on flecainide and metoprolol.  Those had been stopped to see how he does clinically after ablation.  He says that after doing crunches and doing exercise at home yesterday, he felt increasingly unwell.  No nausea no vomiting no chest pain no shortness of.  No abdominal pain no dysuria no diarrhea no constipation no headache no blurry vision.  He feels his heart rate is high, he took Xarelto today.  He had taken 25 mg metoprolol succinate earlier this morning.      Review of systems   Pertinent positives and negatives mentioned in the HPI/MDM.      Physical   Physical Exam  Constitutional:       General: He is not in acute distress.     Appearance: Normal appearance.   Eyes:      Extraocular Movements: Extraocular movements intact.      Pupils: Pupils are equal, round, and reactive to light.   Cardiovascular:      Rate and Rhythm: Regular rhythm. Tachycardia present.      Pulses: Normal pulses.      Heart sounds: Normal heart sounds.   Pulmonary:      Effort: Pulmonary effort is normal. No respiratory distress.   Abdominal:      Palpations: Abdomen is soft.      Tenderness: There is no abdominal tenderness.   Musculoskeletal:         General: No swelling or tenderness. Normal range of motion.   Skin:     General: Skin is warm and dry.      Coloration: Skin is not jaundiced.      Findings: No bruising.   Neurological:      Mental Status: He is alert and oriented

## 2024-10-23 NOTE — CARE COORDINATION
Internal Medicine On-call Care Coordination Note    I was called by the ED physician because they recommended admission for this patient and I cover their PCP.  The history as I understand it after discussion with the ED physician is as follows:    The patient presented with SOB and palpitations  In the ED they found atrial flutter     I placed admission orders.  Including:    Atrial flutter w/ RVR:   Telemetry  Cardiology consultation-- defer rate/rhythm control meds to cardiology.   Defer need for echo to cardio.   TSH  Continue Teresa Pineda or his coverage will see the patient tomorrow for H&P.    Electronically signed by Ramo Pineda DO on 10/23/2024 at 3:57 PM

## 2024-10-23 NOTE — ED NOTES
Dr. Ayala made aware of HR in low 60s after bolus of Cardizem, instructed to repeat EKG, and hold Cardizem infusion.

## 2024-10-24 ENCOUNTER — ANESTHESIA EVENT (OUTPATIENT)
Dept: INPATIENT UNIT | Age: 74
End: 2024-10-24
Payer: MEDICARE

## 2024-10-24 ENCOUNTER — ANESTHESIA (OUTPATIENT)
Dept: INPATIENT UNIT | Age: 74
End: 2024-10-24
Payer: MEDICARE

## 2024-10-24 ENCOUNTER — APPOINTMENT (OUTPATIENT)
Dept: INPATIENT UNIT | Age: 74
End: 2024-10-24
Payer: MEDICARE

## 2024-10-24 VITALS
HEIGHT: 68 IN | SYSTOLIC BLOOD PRESSURE: 138 MMHG | HEART RATE: 73 BPM | TEMPERATURE: 97.3 F | RESPIRATION RATE: 18 BRPM | BODY MASS INDEX: 29.55 KG/M2 | WEIGHT: 195 LBS | OXYGEN SATURATION: 96 % | DIASTOLIC BLOOD PRESSURE: 82 MMHG

## 2024-10-24 LAB
ALBUMIN SERPL-MCNC: 3.8 G/DL (ref 3.5–5.2)
ALP SERPL-CCNC: 86 U/L (ref 40–129)
ALT SERPL-CCNC: 24 U/L (ref 0–40)
ANION GAP SERPL CALCULATED.3IONS-SCNC: 9 MMOL/L (ref 7–16)
AST SERPL-CCNC: 24 U/L (ref 0–39)
BASOPHILS # BLD: 0.08 K/UL (ref 0–0.2)
BASOPHILS NFR BLD: 1 % (ref 0–2)
BILIRUB SERPL-MCNC: 1.3 MG/DL (ref 0–1.2)
BUN SERPL-MCNC: 17 MG/DL (ref 6–23)
CALCIUM SERPL-MCNC: 9.1 MG/DL (ref 8.6–10.2)
CHLORIDE SERPL-SCNC: 104 MMOL/L (ref 98–107)
CO2 SERPL-SCNC: 25 MMOL/L (ref 22–29)
CREAT SERPL-MCNC: 1.3 MG/DL (ref 0.7–1.2)
EOSINOPHIL # BLD: 0.14 K/UL (ref 0.05–0.5)
EOSINOPHILS RELATIVE PERCENT: 2 % (ref 0–6)
ERYTHROCYTE [DISTWIDTH] IN BLOOD BY AUTOMATED COUNT: 12.7 % (ref 11.5–15)
GFR, ESTIMATED: 59 ML/MIN/1.73M2
GLUCOSE SERPL-MCNC: 85 MG/DL (ref 74–99)
HCT VFR BLD AUTO: 45.3 % (ref 37–54)
HGB BLD-MCNC: 15 G/DL (ref 12.5–16.5)
IMM GRANULOCYTES # BLD AUTO: <0.03 K/UL (ref 0–0.58)
IMM GRANULOCYTES NFR BLD: 0 % (ref 0–5)
LYMPHOCYTES NFR BLD: 1.89 K/UL (ref 1.5–4)
LYMPHOCYTES RELATIVE PERCENT: 29 % (ref 20–42)
MCH RBC QN AUTO: 29.6 PG (ref 26–35)
MCHC RBC AUTO-ENTMCNC: 33.1 G/DL (ref 32–34.5)
MCV RBC AUTO: 89.5 FL (ref 80–99.9)
MONOCYTES NFR BLD: 0.71 K/UL (ref 0.1–0.95)
MONOCYTES NFR BLD: 11 % (ref 2–12)
NEUTROPHILS NFR BLD: 57 % (ref 43–80)
NEUTS SEG NFR BLD: 3.69 K/UL (ref 1.8–7.3)
PLATELET # BLD AUTO: 232 K/UL (ref 130–450)
PMV BLD AUTO: 11.3 FL (ref 7–12)
POTASSIUM SERPL-SCNC: 4.3 MMOL/L (ref 3.5–5)
PROT SERPL-MCNC: 6.1 G/DL (ref 6.4–8.3)
RBC # BLD AUTO: 5.06 M/UL (ref 3.8–5.8)
SODIUM SERPL-SCNC: 138 MMOL/L (ref 132–146)
TSH SERPL DL<=0.05 MIU/L-ACNC: 2.96 UIU/ML (ref 0.27–4.2)
WBC OTHER # BLD: 6.5 K/UL (ref 4.5–11.5)

## 2024-10-24 PROCEDURE — 84443 ASSAY THYROID STIM HORMONE: CPT

## 2024-10-24 PROCEDURE — 6370000000 HC RX 637 (ALT 250 FOR IP): Performed by: INTERNAL MEDICINE

## 2024-10-24 PROCEDURE — 6360000002 HC RX W HCPCS

## 2024-10-24 PROCEDURE — 96366 THER/PROPH/DIAG IV INF ADDON: CPT

## 2024-10-24 PROCEDURE — 85025 COMPLETE CBC W/AUTO DIFF WBC: CPT

## 2024-10-24 PROCEDURE — 80053 COMPREHEN METABOLIC PANEL: CPT

## 2024-10-24 PROCEDURE — 2500000003 HC RX 250 WO HCPCS

## 2024-10-24 PROCEDURE — 2580000003 HC RX 258

## 2024-10-24 PROCEDURE — G0378 HOSPITAL OBSERVATION PER HR: HCPCS

## 2024-10-24 PROCEDURE — 2500000003 HC RX 250 WO HCPCS: Performed by: INTERNAL MEDICINE

## 2024-10-24 PROCEDURE — 7100000011 HC PHASE II RECOVERY - ADDTL 15 MIN: Performed by: ANESTHESIOLOGY

## 2024-10-24 PROCEDURE — 3700000001 HC ADD 15 MINUTES (ANESTHESIA): Performed by: ANESTHESIOLOGY

## 2024-10-24 PROCEDURE — 2580000003 HC RX 258: Performed by: INTERNAL MEDICINE

## 2024-10-24 PROCEDURE — 93005 ELECTROCARDIOGRAM TRACING: CPT | Performed by: INTERNAL MEDICINE

## 2024-10-24 PROCEDURE — 7100000010 HC PHASE II RECOVERY - FIRST 15 MIN: Performed by: ANESTHESIOLOGY

## 2024-10-24 PROCEDURE — 3700000000 HC ANESTHESIA ATTENDED CARE: Performed by: ANESTHESIOLOGY

## 2024-10-24 PROCEDURE — 92960 CARDIOVERSION ELECTRIC EXT: CPT | Performed by: ANESTHESIOLOGY

## 2024-10-24 RX ORDER — GLYCOPYRROLATE 0.2 MG/ML
INJECTION INTRAMUSCULAR; INTRAVENOUS
Status: DISCONTINUED | OUTPATIENT
Start: 2024-10-24 | End: 2024-10-24 | Stop reason: SDUPTHER

## 2024-10-24 RX ORDER — SODIUM CHLORIDE 0.9 % (FLUSH) 0.9 %
5-40 SYRINGE (ML) INJECTION PRN
Status: DISCONTINUED | OUTPATIENT
Start: 2024-10-24 | End: 2024-10-24 | Stop reason: HOSPADM

## 2024-10-24 RX ORDER — SODIUM CHLORIDE 9 MG/ML
INJECTION, SOLUTION INTRAVENOUS
Status: DISCONTINUED | OUTPATIENT
Start: 2024-10-24 | End: 2024-10-24 | Stop reason: SDUPTHER

## 2024-10-24 RX ORDER — FLECAINIDE ACETATE 100 MG/1
100 TABLET ORAL 2 TIMES DAILY
Qty: 60 TABLET | Refills: 3 | Status: SHIPPED | OUTPATIENT
Start: 2024-10-24

## 2024-10-24 RX ORDER — SODIUM CHLORIDE 0.9 % (FLUSH) 0.9 %
5-40 SYRINGE (ML) INJECTION EVERY 12 HOURS SCHEDULED
Status: DISCONTINUED | OUTPATIENT
Start: 2024-10-24 | End: 2024-10-24 | Stop reason: HOSPADM

## 2024-10-24 RX ORDER — SODIUM CHLORIDE 9 MG/ML
INJECTION, SOLUTION INTRAVENOUS PRN
Status: DISCONTINUED | OUTPATIENT
Start: 2024-10-24 | End: 2024-10-24 | Stop reason: HOSPADM

## 2024-10-24 RX ORDER — METOPROLOL SUCCINATE 25 MG/1
25 TABLET, EXTENDED RELEASE ORAL DAILY
Qty: 30 TABLET | Refills: 3 | Status: SHIPPED | OUTPATIENT
Start: 2024-10-24

## 2024-10-24 RX ORDER — LIDOCAINE HYDROCHLORIDE 20 MG/ML
INJECTION, SOLUTION EPIDURAL; INFILTRATION; INTRACAUDAL; PERINEURAL
Status: DISCONTINUED | OUTPATIENT
Start: 2024-10-24 | End: 2024-10-24 | Stop reason: SDUPTHER

## 2024-10-24 RX ORDER — PROPOFOL 10 MG/ML
INJECTION, EMULSION INTRAVENOUS
Status: DISCONTINUED | OUTPATIENT
Start: 2024-10-24 | End: 2024-10-24 | Stop reason: SDUPTHER

## 2024-10-24 RX ORDER — TAMSULOSIN HYDROCHLORIDE 0.4 MG/1
0.4 CAPSULE ORAL DAILY
Qty: 14 CAPSULE | Refills: 0 | Status: SHIPPED | OUTPATIENT
Start: 2024-10-24 | End: 2024-11-07

## 2024-10-24 RX ADMIN — GLYCOPYRROLATE 0.2 MG: 0.2 INJECTION, SOLUTION INTRAMUSCULAR; INTRAVENOUS at 10:33

## 2024-10-24 RX ADMIN — LISINOPRIL 40 MG: 20 TABLET ORAL at 10:03

## 2024-10-24 RX ADMIN — LIDOCAINE HYDROCHLORIDE 25 MG: 20 INJECTION, SOLUTION EPIDURAL; INFILTRATION; INTRACAUDAL; PERINEURAL at 10:27

## 2024-10-24 RX ADMIN — PROPOFOL 50 MG: 10 INJECTION, EMULSION INTRAVENOUS at 10:27

## 2024-10-24 RX ADMIN — SODIUM CHLORIDE: 9 INJECTION, SOLUTION INTRAVENOUS at 10:21

## 2024-10-24 RX ADMIN — SODIUM CHLORIDE 5 MG/HR: 900 INJECTION, SOLUTION INTRAVENOUS at 10:07

## 2024-10-24 RX ADMIN — METOPROLOL SUCCINATE 25 MG: 25 TABLET, EXTENDED RELEASE ORAL at 10:03

## 2024-10-24 RX ADMIN — RIVAROXABAN 20 MG: 20 TABLET, FILM COATED ORAL at 10:03

## 2024-10-24 RX ADMIN — PROPOFOL 20 MG: 10 INJECTION, EMULSION INTRAVENOUS at 10:28

## 2024-10-24 RX ADMIN — FLECAINIDE ACETATE 100 MG: 100 TABLET ORAL at 10:03

## 2024-10-24 ASSESSMENT — PAIN - FUNCTIONAL ASSESSMENT: PAIN_FUNCTIONAL_ASSESSMENT: NONE - DENIES PAIN

## 2024-10-24 NOTE — CARE COORDINATION
Introduced my self and provided explanation of CM role to patient.  Patient is awake, alert, and aware of current diagnosis and treatment plan including cardioversion, medication titration, potential dc planning.  He voices he resides at home with his spouse and completes his adl's with independence.  Patient is established with a pcp and denies any issue with retail pharmaceutical coverage.  Patient verbalizes no concerns and identifies no areas to focus on nor barriers to discharge.   He is hopeful for discharge as soon as today.  Krishna Delarosa, MSN RN  [facility] Case Management  [phone]

## 2024-10-24 NOTE — ANESTHESIA PRE PROCEDURE
Temp: 98.1 °F (36.7 °C) 97.5 °F (36.4 °C)  97.1 °F (36.2 °C)   TempSrc: Oral Oral  Oral   SpO2: 99% 99% 98% 95%   Weight:       Height:                                                  BP Readings from Last 3 Encounters:   10/24/24 124/78   03/10/21 (!) 153/84   07/31/20 103/66       NPO Status:                           >8 hours                                                      BMI:   Wt Readings from Last 3 Encounters:   10/23/24 88.5 kg (195 lb)   03/10/21 88.5 kg (195 lb)   07/31/20 83.9 kg (185 lb)     Body mass index is 29.65 kg/m².    CBC:   Lab Results   Component Value Date/Time    WBC 6.5 10/24/2024 05:19 AM    RBC 5.06 10/24/2024 05:19 AM    HGB 15.0 10/24/2024 05:19 AM    HCT 45.3 10/24/2024 05:19 AM    MCV 89.5 10/24/2024 05:19 AM    RDW 12.7 10/24/2024 05:19 AM     10/24/2024 05:19 AM       CMP:   Lab Results   Component Value Date/Time     10/24/2024 05:19 AM    K 4.3 10/24/2024 05:19 AM    K 4.2 06/22/2020 09:01 AM     10/24/2024 05:19 AM    CO2 25 10/24/2024 05:19 AM    BUN 17 10/24/2024 05:19 AM    CREATININE 1.3 10/24/2024 05:19 AM    GFRAA >60 03/10/2021 07:37 PM    LABGLOM 59 10/24/2024 05:19 AM    GLUCOSE 85 10/24/2024 05:19 AM    CALCIUM 9.1 10/24/2024 05:19 AM    BILITOT 1.3 10/24/2024 05:19 AM    ALKPHOS 86 10/24/2024 05:19 AM    AST 24 10/24/2024 05:19 AM    ALT 24 10/24/2024 05:19 AM       POC Tests: No results for input(s): \"POCGLU\", \"POCNA\", \"POCK\", \"POCCL\", \"POCBUN\", \"POCHEMO\", \"POCHCT\" in the last 72 hours.    Coags:   Lab Results   Component Value Date/Time    PROTIME 12.3 06/22/2020 09:01 AM    INR 1.1 06/22/2020 09:01 AM    APTT 27.9 06/22/2020 09:01 AM       HCG (If Applicable): No results found for: \"PREGTESTUR\", \"PREGSERUM\", \"HCG\", \"HCGQUANT\"     ABGs: No results found for: \"PHART\", \"PO2ART\", \"QLH2NNV\", \"JKG6XDR\", \"BEART\", \"E7KGGCII\"     Type & Screen (If Applicable):  No results found for: \"LABABO\"    Drug/Infectious Status (If Applicable):  No results

## 2024-10-24 NOTE — PROGRESS NOTES
Patient did not need meds- stated that he has meds at home   VTE ppx: lovenox subQ  Activity: fall precaution, OOB with assistance  Diet: regular

## 2024-10-24 NOTE — PROCEDURES
Cardioversion Report      Indication:  Atrial flutter    Procedure:  Direct current electrical cardioversion under deep sedation    Primary Care Physician: Arash Cummings MD    Clinical History:  73 y.o. year-old male recent atrila flutter ablation 10/8/24, back in atrial flutter . On xarelto. For CV today.    Current Outpatient Medications:  Current Facility-Administered Medications   Medication Dose Route Frequency Provider Last Rate Last Admin    melatonin tablet 3 mg  3 mg Oral Nightly PRN Volino, Ramo E, DO        sodium chloride flush 0.9 % injection 10 mL  10 mL IntraVENous 2 times per day VolinoRamo E, DO   10 mL at 10/23/24 2048    sodium chloride flush 0.9 % injection 10 mL  10 mL IntraVENous PRN Volino, Ramo E, DO        0.9 % sodium chloride infusion   IntraVENous PRN Yamelino, Ramo E, DO        potassium chloride (KLOR-CON M) extended release tablet 40 mEq  40 mEq Oral PRN Volino, Ramo E, DO        Or    potassium bicarb-citric acid (EFFER-K) effervescent tablet 40 mEq  40 mEq Oral PRN Yamelino, Ramo E, DO        Or    potassium chloride 10 mEq/100 mL IVPB (Peripheral Line)  10 mEq IntraVENous PRN Volino, Ramo E, DO        magnesium sulfate 2000 mg in 50 mL IVPB premix  2,000 mg IntraVENous PRN Yamelino, Ramo E, DO        ondansetron (ZOFRAN-ODT) disintegrating tablet 4 mg  4 mg Oral Q8H PRN Yamelino, Ramo E, DO        Or    ondansetron (ZOFRAN) injection 4 mg  4 mg IntraVENous Q6H PRN Yamelino, Ramo E, DO        senna (SENOKOT) tablet 8.6 mg  1 tablet Oral Daily PRN Yamelino, Ramo E, DO        acetaminophen (TYLENOL) tablet 650 mg  650 mg Oral Q6H PRN Yamelino, Ramo E, DO        Or    acetaminophen (TYLENOL) suppository 650 mg  650 mg Rectal Q6H PRN Yamelino, Ramo E, DO        flecainide (TAMBOCOR) tablet 100 mg  100 mg Oral BID Ramo Pineda E, DO   100 mg at 10/24/24 1003    metoprolol succinate (TOPROL XL) extended release tablet 25 mg  25 mg Oral Daily YamelinoRamo E, DO   25 mg at 10/24/24 1003

## 2024-10-24 NOTE — DISCHARGE SUMMARY
Patient discharged same day as admission after cardioversion.  See H&P for details.       Medication List        CONTINUE taking these medications      Altace 10 MG capsule  Generic drug: ramipril     Crestor 10 MG tablet  Generic drug: rosuvastatin     flecainide 100 MG tablet  Commonly known as: TAMBOCOR  Take 1 tablet by mouth 2 times daily     metoprolol succinate 25 MG extended release tablet  Commonly known as: TOPROL XL  Take 1 tablet by mouth daily     rivaroxaban 20 MG Tabs tablet  Commonly known as: XARELTO  Take 1 tablet by mouth daily     tamsulosin 0.4 MG capsule  Commonly known as: FLOMAX  Take 1 capsule by mouth daily for 14 days            STOP taking these medications      amLODIPine 5 MG tablet  Commonly known as: NORVASC               Where to Get Your Medications        These medications were sent to [address] -  [phone] -  [phone]  [address] [place] [id number]      Phone: [phone]   flecainide 100 MG tablet  metoprolol succinate 25 MG extended release tablet  tamsulosin 0.4 MG capsule       Electronically signed by James Lake MD on 10/24/2024 at 3:42 PM

## 2024-10-24 NOTE — PROGRESS NOTES
Cardiology:  S/p cardioversion this morning.  Resuming flecainide 100 mg bid and would keep in metoprolol succinate 25 mg bid at home.  Could go home later today.  Should f/u with [facility] EP in regards to maintaining antiarrhythmic therapy, but would continue it for now during the healing process.  Continue OAC with xarelto.  Has been instructed about intensity of his exercise regime.

## 2024-10-24 NOTE — CONSULTS
[facility]    INPATIENT CARDIOLOGY CONSULT    Name: Favio Kitchen Jr.    Age: 73 y.o.    Date of Admission: 10/23/2024 11:58 AM    Date of Service: 10/24/2024    Reason for Consultation: Atrial flutter    Referring Physician: Dr. Lake  Primary Care Physician: Arash Cummings MD    History of Present Illness: The patient is a 73 y.o. year old male with remote CV 2014 for symptomatic atrial fibrillation, recent ablation for atrial flutter at the [facility] 2 weeks ago 10/8/2024.  States he was fine for a week and then started working out on Tuesday of this week 2 days ago and went into atrial flutter again.  Has been on flecainide for rhythm suppression, and metoprolol which were both discontinued upon being released from [facility] Much less symptomatic , just felt a little more fatigue with physical labor. No palpitations lightheadedness, chest pain, orthopnea ,PND or dyspnea with normal activities.  Been taking Xarelto for anticoagulation.      He has a past medical history of HTN, HLD, and persistent atrial fibrillation diagnosed in 2014. He had failed DCC x 2, and started Flecainide in 2015. He was controlled on Flecainide until 6/2020, but he then developed recurrence of AF and slow AFL. He had an additional DCC at that time, and subsequently underwent PVI and CTI line ablation on 12/12/20. He had been maintaining sinus rhythm, Flecainide reduced 3/2021. He noted decreasing exercise tolerance while using the treadmill in May 2023, PCP did an EKG which showed AF at controlled rate. He increased his Flecainide. He was last seen 6/26/2023 and was in AFL, underwent DCC 7/24/23. NM stress test 10/2023 showed no ischemia with EF 66%.     Past Medical History:   Past Medical History:   Diagnosis Date    A-fib ([facility])     follows with Dr. Aleman    Environmental allergies     Hypertension        Review of Systems:   Constitutional: No fever, chills, sweats  Cardiac: As per HPI  Pulmonary: No cough,

## 2024-10-24 NOTE — PLAN OF CARE
Problem: Discharge Planning  Goal: Discharge to home or other facility with appropriate resources  10/24/2024 0240 by Joanie Pepe, RN  Outcome: Progressing  10/24/2024 0240 by Joanie Pepe, RN  Outcome: Progressing

## 2024-10-24 NOTE — ACP (ADVANCE CARE PLANNING)
Advance Care Planning   Healthcare Decision Maker:    Primary Decision Maker: Fidelina,Sona [facility] - [phone]    Click here to complete Healthcare Decision Makers including selection of the Healthcare Decision Maker Relationship (ie \"Primary\").

## 2024-10-24 NOTE — ANESTHESIA POSTPROCEDURE EVALUATION
Department of Anesthesiology  Postprocedure Note    Patient: Favio Kitchen Jr.  MRN: [id number]  YOB: 1950  Date of evaluation: 10/24/2024    Procedure Summary       Date: 10/24/24 Room / Location: [facility] Stage I    Anesthesia Start: 1022 Anesthesia Stop: 1038    Procedure: CARDIOVERSION Diagnosis:     Scheduled Providers:  Responsible Provider: Suzanne Perez DO    Anesthesia Type: MAC ASA Status: 3            Anesthesia Type: MAC    Yolanda Phase I:      Yolanda Phase II:      Anesthesia Post Evaluation    Patient location during evaluation: PACU  Patient participation: complete - patient participated  Level of consciousness: awake and awake and alert  Pain score: 0  Airway patency: patent  Nausea & Vomiting: no nausea and no vomiting  Cardiovascular status: blood pressure returned to baseline and hemodynamically stable  Respiratory status: acceptable  Hydration status: euvolemic  Pain management: adequate        No notable events documented.

## 2024-10-25 LAB
EKG ATRIAL RATE: 123 BPM
EKG ATRIAL RATE: 250 BPM
EKG ATRIAL RATE: 258 BPM
EKG ATRIAL RATE: 57 BPM
EKG P AXIS: 151 DEGREES
EKG P AXIS: 75 DEGREES
EKG P AXIS: 86 DEGREES
EKG P AXIS: 93 DEGREES
EKG P-R INTERVAL: 128 MS
EKG P-R INTERVAL: 188 MS
EKG Q-T INTERVAL: 280 MS
EKG Q-T INTERVAL: 336 MS
EKG Q-T INTERVAL: 408 MS
EKG Q-T INTERVAL: 438 MS
EKG QRS DURATION: 170 MS
EKG QRS DURATION: 80 MS
EKG QRS DURATION: 82 MS
EKG QRS DURATION: 90 MS
EKG QTC CALCULATION (BAZETT): 410 MS
EKG QTC CALCULATION (BAZETT): 414 MS
EKG QTC CALCULATION (BAZETT): 426 MS
EKG QTC CALCULATION (BAZETT): 481 MS
EKG R AXIS: -29 DEGREES
EKG R AXIS: -54 DEGREES
EKG R AXIS: -66 DEGREES
EKG R AXIS: -70 DEGREES
EKG T AXIS: -31 DEGREES
EKG T AXIS: -96 DEGREES
EKG T AXIS: -98 DEGREES
EKG T AXIS: 143 DEGREES
EKG VENTRICULAR RATE: 123 BPM
EKG VENTRICULAR RATE: 129 BPM
EKG VENTRICULAR RATE: 57 BPM
EKG VENTRICULAR RATE: 62 BPM

## 2024-11-05 ENCOUNTER — APPOINTMENT (OUTPATIENT)
Dept: GENERAL RADIOLOGY | Age: 74
End: 2024-11-05
Payer: MEDICARE

## 2024-11-05 ENCOUNTER — HOSPITAL ENCOUNTER (OUTPATIENT)
Age: 74
Setting detail: OBSERVATION
Discharge: HOME | End: 2024-11-07
Attending: EMERGENCY MEDICINE | Admitting: INTERNAL MEDICINE
Payer: MEDICARE

## 2024-11-05 DIAGNOSIS — I48.91 ATRIAL FIBRILLATION WITH NORMAL VENTRICULAR RATE (HCC): ICD-10-CM

## 2024-11-05 DIAGNOSIS — I48.92 ATRIAL FLUTTER WITH RAPID VENTRICULAR RESPONSE (HCC): Primary | ICD-10-CM

## 2024-11-05 DIAGNOSIS — I48.19 PERSISTENT ATRIAL FIBRILLATION (HCC): ICD-10-CM

## 2024-11-05 DIAGNOSIS — R94.31 ABNORMAL EKG: ICD-10-CM

## 2024-11-05 DIAGNOSIS — I10 ESSENTIAL HYPERTENSION: ICD-10-CM

## 2024-11-05 LAB
ALBUMIN SERPL-MCNC: 4.1 G/DL (ref 3.5–5.2)
ALP SERPL-CCNC: 97 U/L (ref 40–129)
ALT SERPL-CCNC: 15 U/L (ref 0–40)
ANION GAP SERPL CALCULATED.3IONS-SCNC: 8 MMOL/L (ref 7–16)
AST SERPL-CCNC: 20 U/L (ref 0–39)
BASOPHILS # BLD: 0.05 K/UL (ref 0–0.2)
BASOPHILS NFR BLD: 1 % (ref 0–2)
BILIRUB SERPL-MCNC: 0.8 MG/DL (ref 0–1.2)
BUN SERPL-MCNC: 19 MG/DL (ref 6–23)
CALCIUM SERPL-MCNC: 9.3 MG/DL (ref 8.6–10.2)
CHLORIDE SERPL-SCNC: 103 MMOL/L (ref 98–107)
CO2 SERPL-SCNC: 28 MMOL/L (ref 22–29)
CREAT SERPL-MCNC: 1.6 MG/DL (ref 0.7–1.2)
EKG ATRIAL RATE: 84 BPM
EKG P AXIS: 77 DEGREES
EKG P-R INTERVAL: 212 MS
EKG Q-T INTERVAL: 410 MS
EKG QRS DURATION: 144 MS
EKG QTC CALCULATION (BAZETT): 484 MS
EKG R AXIS: -87 DEGREES
EKG T AXIS: -83 DEGREES
EKG VENTRICULAR RATE: 84 BPM
EOSINOPHIL # BLD: 0.09 K/UL (ref 0.05–0.5)
EOSINOPHILS RELATIVE PERCENT: 2 % (ref 0–6)
ERYTHROCYTE [DISTWIDTH] IN BLOOD BY AUTOMATED COUNT: 12.9 % (ref 11.5–15)
GFR, ESTIMATED: 48 ML/MIN/1.73M2
GLUCOSE SERPL-MCNC: 150 MG/DL (ref 74–99)
HCT VFR BLD AUTO: 47.4 % (ref 37–54)
HGB BLD-MCNC: 15.7 G/DL (ref 12.5–16.5)
IMM GRANULOCYTES # BLD AUTO: <0.03 K/UL (ref 0–0.58)
IMM GRANULOCYTES NFR BLD: 0 % (ref 0–5)
LYMPHOCYTES NFR BLD: 0.83 K/UL (ref 1.5–4)
LYMPHOCYTES RELATIVE PERCENT: 14 % (ref 20–42)
MAGNESIUM SERPL-MCNC: 2.2 MG/DL (ref 1.6–2.6)
MCH RBC QN AUTO: 29.7 PG (ref 26–35)
MCHC RBC AUTO-ENTMCNC: 33.1 G/DL (ref 32–34.5)
MCV RBC AUTO: 89.8 FL (ref 80–99.9)
MONOCYTES NFR BLD: 0.49 K/UL (ref 0.1–0.95)
MONOCYTES NFR BLD: 8 % (ref 2–12)
NEUTROPHILS NFR BLD: 75 % (ref 43–80)
NEUTS SEG NFR BLD: 4.36 K/UL (ref 1.8–7.3)
PLATELET # BLD AUTO: 303 K/UL (ref 130–450)
PMV BLD AUTO: 11.2 FL (ref 7–12)
POTASSIUM SERPL-SCNC: 4.7 MMOL/L (ref 3.5–5)
PROT SERPL-MCNC: 6.6 G/DL (ref 6.4–8.3)
RBC # BLD AUTO: 5.28 M/UL (ref 3.8–5.8)
SODIUM SERPL-SCNC: 139 MMOL/L (ref 132–146)
TROPONIN I SERPL HS-MCNC: 14 NG/L (ref 0–11)
TROPONIN I SERPL HS-MCNC: 15 NG/L (ref 0–11)
WBC OTHER # BLD: 5.8 K/UL (ref 4.5–11.5)

## 2024-11-05 PROCEDURE — 93005 ELECTROCARDIOGRAM TRACING: CPT

## 2024-11-05 PROCEDURE — 71046 X-RAY EXAM CHEST 2 VIEWS: CPT

## 2024-11-05 PROCEDURE — 80053 COMPREHEN METABOLIC PANEL: CPT

## 2024-11-05 PROCEDURE — 99285 EMERGENCY DEPT VISIT HI MDM: CPT

## 2024-11-05 PROCEDURE — 2580000003 HC RX 258: Performed by: HOSPITALIST

## 2024-11-05 PROCEDURE — G0378 HOSPITAL OBSERVATION PER HR: HCPCS

## 2024-11-05 PROCEDURE — 84484 ASSAY OF TROPONIN QUANT: CPT

## 2024-11-05 PROCEDURE — 6370000000 HC RX 637 (ALT 250 FOR IP): Performed by: HOSPITALIST

## 2024-11-05 PROCEDURE — 6370000000 HC RX 637 (ALT 250 FOR IP): Performed by: INTERNAL MEDICINE

## 2024-11-05 PROCEDURE — 93010 ELECTROCARDIOGRAM REPORT: CPT | Performed by: INTERNAL MEDICINE

## 2024-11-05 PROCEDURE — 85025 COMPLETE CBC W/AUTO DIFF WBC: CPT

## 2024-11-05 PROCEDURE — 83735 ASSAY OF MAGNESIUM: CPT

## 2024-11-05 RX ORDER — CETIRIZINE HYDROCHLORIDE 10 MG/1
10 TABLET ORAL DAILY PRN
COMMUNITY

## 2024-11-05 RX ORDER — ACETAMINOPHEN 650 MG/1
650 SUPPOSITORY RECTAL EVERY 6 HOURS PRN
Status: DISCONTINUED | OUTPATIENT
Start: 2024-11-05 | End: 2024-11-07 | Stop reason: HOSPADM

## 2024-11-05 RX ORDER — ONDANSETRON 2 MG/ML
4 INJECTION INTRAMUSCULAR; INTRAVENOUS EVERY 6 HOURS PRN
Status: DISCONTINUED | OUTPATIENT
Start: 2024-11-05 | End: 2024-11-07 | Stop reason: HOSPADM

## 2024-11-05 RX ORDER — ACETAMINOPHEN 500 MG
500 TABLET ORAL EVERY 8 HOURS PRN
COMMUNITY

## 2024-11-05 RX ORDER — MAGNESIUM SULFATE IN WATER 40 MG/ML
2000 INJECTION, SOLUTION INTRAVENOUS PRN
Status: DISCONTINUED | OUTPATIENT
Start: 2024-11-05 | End: 2024-11-07 | Stop reason: HOSPADM

## 2024-11-05 RX ORDER — METOPROLOL SUCCINATE 50 MG/1
50 TABLET, EXTENDED RELEASE ORAL 2 TIMES DAILY
Status: DISCONTINUED | OUTPATIENT
Start: 2024-11-05 | End: 2024-11-07 | Stop reason: HOSPADM

## 2024-11-05 RX ORDER — ONDANSETRON 4 MG/1
4 TABLET, ORALLY DISINTEGRATING ORAL EVERY 8 HOURS PRN
Status: DISCONTINUED | OUTPATIENT
Start: 2024-11-05 | End: 2024-11-07 | Stop reason: HOSPADM

## 2024-11-05 RX ORDER — POTASSIUM CHLORIDE 7.45 MG/ML
10 INJECTION INTRAVENOUS PRN
Status: DISCONTINUED | OUTPATIENT
Start: 2024-11-05 | End: 2024-11-05 | Stop reason: RX

## 2024-11-05 RX ORDER — ROSUVASTATIN CALCIUM 10 MG/1
10 TABLET, COATED ORAL EVERY MORNING
Status: DISCONTINUED | OUTPATIENT
Start: 2024-11-06 | End: 2024-11-07 | Stop reason: HOSPADM

## 2024-11-05 RX ORDER — SODIUM CHLORIDE 0.9 % (FLUSH) 0.9 %
5-40 SYRINGE (ML) INJECTION PRN
Status: DISCONTINUED | OUTPATIENT
Start: 2024-11-05 | End: 2024-11-07 | Stop reason: HOSPADM

## 2024-11-05 RX ORDER — ACETAMINOPHEN 325 MG/1
650 TABLET ORAL EVERY 6 HOURS PRN
Status: DISCONTINUED | OUTPATIENT
Start: 2024-11-05 | End: 2024-11-07 | Stop reason: HOSPADM

## 2024-11-05 RX ORDER — METOPROLOL SUCCINATE 25 MG/1
50 TABLET, EXTENDED RELEASE ORAL 2 TIMES DAILY
COMMUNITY

## 2024-11-05 RX ORDER — LEVOTHYROXINE SODIUM 200 UG/1
200 TABLET ORAL EVERY MORNING
COMMUNITY
End: 2024-11-05 | Stop reason: ALTCHOICE

## 2024-11-05 RX ORDER — SODIUM CHLORIDE 9 MG/ML
INJECTION, SOLUTION INTRAVENOUS PRN
Status: DISCONTINUED | OUTPATIENT
Start: 2024-11-05 | End: 2024-11-07 | Stop reason: HOSPADM

## 2024-11-05 RX ORDER — SODIUM CHLORIDE 0.9 % (FLUSH) 0.9 %
5-40 SYRINGE (ML) INJECTION EVERY 12 HOURS SCHEDULED
Status: DISCONTINUED | OUTPATIENT
Start: 2024-11-05 | End: 2024-11-07 | Stop reason: HOSPADM

## 2024-11-05 RX ORDER — ACETAMINOPHEN 500 MG
500 TABLET ORAL EVERY 8 HOURS PRN
Status: DISCONTINUED | OUTPATIENT
Start: 2024-11-05 | End: 2024-11-05 | Stop reason: DRUGHIGH

## 2024-11-05 RX ORDER — CETIRIZINE HYDROCHLORIDE 10 MG/1
10 TABLET ORAL DAILY PRN
Status: DISCONTINUED | OUTPATIENT
Start: 2024-11-05 | End: 2024-11-07 | Stop reason: HOSPADM

## 2024-11-05 RX ORDER — POLYETHYLENE GLYCOL 3350 17 G/17G
17 POWDER, FOR SOLUTION ORAL DAILY PRN
Status: DISCONTINUED | OUTPATIENT
Start: 2024-11-05 | End: 2024-11-07 | Stop reason: HOSPADM

## 2024-11-05 RX ORDER — FLECAINIDE ACETATE 100 MG/1
100 TABLET ORAL 2 TIMES DAILY
Status: DISCONTINUED | OUTPATIENT
Start: 2024-11-05 | End: 2024-11-07 | Stop reason: HOSPADM

## 2024-11-05 RX ORDER — LEVOTHYROXINE SODIUM 200 MCG
200 TABLET ORAL
COMMUNITY

## 2024-11-05 RX ORDER — LANOLIN ALCOHOL/MO/W.PET/CERES
3 CREAM (GRAM) TOPICAL NIGHTLY PRN
Status: DISCONTINUED | OUTPATIENT
Start: 2024-11-05 | End: 2024-11-07 | Stop reason: HOSPADM

## 2024-11-05 RX ORDER — AMLODIPINE BESYLATE 10 MG/1
10 TABLET ORAL DAILY
Status: DISCONTINUED | OUTPATIENT
Start: 2024-11-05 | End: 2024-11-07 | Stop reason: HOSPADM

## 2024-11-05 RX ORDER — POTASSIUM CHLORIDE 1500 MG/1
40 TABLET, EXTENDED RELEASE ORAL PRN
Status: DISCONTINUED | OUTPATIENT
Start: 2024-11-05 | End: 2024-11-07 | Stop reason: HOSPADM

## 2024-11-05 RX ORDER — LEVOTHYROXINE SODIUM 100 UG/1
200 TABLET ORAL
Status: DISCONTINUED | OUTPATIENT
Start: 2024-11-06 | End: 2024-11-07 | Stop reason: HOSPADM

## 2024-11-05 RX ORDER — LISINOPRIL 10 MG/1
10 TABLET ORAL DAILY
Status: DISCONTINUED | OUTPATIENT
Start: 2024-11-05 | End: 2024-11-07 | Stop reason: HOSPADM

## 2024-11-05 RX ADMIN — AMLODIPINE BESYLATE 10 MG: 10 TABLET ORAL at 14:00

## 2024-11-05 RX ADMIN — METOPROLOL SUCCINATE 50 MG: 50 TABLET, EXTENDED RELEASE ORAL at 20:51

## 2024-11-05 RX ADMIN — SODIUM CHLORIDE, PRESERVATIVE FREE 5 ML: 5 INJECTION INTRAVENOUS at 21:01

## 2024-11-05 RX ADMIN — LISINOPRIL 10 MG: 10 TABLET ORAL at 16:38

## 2024-11-05 RX ADMIN — FLECAINIDE ACETATE 100 MG: 100 TABLET ORAL at 20:51

## 2024-11-05 ASSESSMENT — PAIN - FUNCTIONAL ASSESSMENT: PAIN_FUNCTIONAL_ASSESSMENT: NONE - DENIES PAIN

## 2024-11-05 ASSESSMENT — LIFESTYLE VARIABLES
HOW MANY STANDARD DRINKS CONTAINING ALCOHOL DO YOU HAVE ON A TYPICAL DAY: PATIENT DOES NOT DRINK
HOW OFTEN DO YOU HAVE A DRINK CONTAINING ALCOHOL: NEVER

## 2024-11-05 NOTE — ED PROVIDER NOTES
VITALS REVIEWED ---------------------------   The nursing notes within the ED encounter and vital signs as below have been reviewed.   BP (!) 154/113   Pulse 93   Temp 97.6 °F (36.4 °C) (Oral)   Resp 18   Ht 1.727 m (5' 8\")   Wt 87.5 kg (193 lb)   SpO2 100%   BMI 29.35 kg/m²   Oxygen Saturation Interpretation: Normal      ---------------------------------------------------PHYSICAL EXAM--------------------------------------      Constitutional/General: Alert and oriented x3, well appearing, non toxic in NAD  Head: Normocephalic and atraumatic  Mouth: Oropharynx clear, handling secretions, no trismus  Neck: Supple, full ROM,   Pulmonary: Lungs clear to auscultation bilaterally, no wheezes, rales, or rhonchi. Not in respiratory distress  Cardiovascular:  Regular rate and rhythm. 2+ distal pulses  Abdomen: Soft, non tender, non distended,   Extremities: Moves all extremities x 4. Warm and well perfused. No leg swelling  Skin: warm and dry without rash  Neurologic: GCS 15, no focal motor or sensory deficits   Psych: Normal Affect. Behavior normal.      ------------------------------ ED COURSE/MEDICAL DECISION MAKING----------------------  Medications   amLODIPine (NORVASC) tablet 10 mg (10 mg Oral Given 11/5/24 1400)   rosuvastatin (CRESTOR) tablet 10 mg (has no administration in time range)   flecainide (TAMBOCOR) tablet 100 mg (has no administration in time range)   rivaroxaban (XARELTO) tablet 20 mg (has no administration in time range)   metoprolol succinate (TOPROL XL) extended release tablet 50 mg (has no administration in time range)   levothyroxine (SYNTHROID) tablet 200 mcg (has no administration in time range)   cetirizine (ZYRTEC) tablet 10 mg (has no administration in time range)   lisinopril (PRINIVIL;ZESTRIL) tablet 10 mg (has no administration in time range)   sodium chloride flush 0.9 % injection 5-40 mL (has no administration in time range)   sodium chloride flush 0.9 % injection 5-40 mL (has no  administration in time range)   0.9 % sodium chloride infusion (has no administration in time range)   potassium chloride (KLOR-CON M) extended release tablet 40 mEq (has no administration in time range)     Or   potassium bicarb-citric acid (EFFER-K) effervescent tablet 40 mEq (has no administration in time range)   magnesium sulfate 2000 mg in 50 mL IVPB premix (has no administration in time range)   ondansetron (ZOFRAN-ODT) disintegrating tablet 4 mg (has no administration in time range)     Or   ondansetron (ZOFRAN) injection 4 mg (has no administration in time range)   polyethylene glycol (GLYCOLAX) packet 17 g (has no administration in time range)   acetaminophen (TYLENOL) tablet 650 mg (has no administration in time range)     Or   acetaminophen (TYLENOL) suppository 650 mg (has no administration in time range)       Medical Decision Making/ED COURSE:    History From: patient     Patient is a 73 y.o. male presenting to the ED for acute onset concern for hypertension and a. fib, moderate in severity. In the ED, patient was hemodynamically stable and afebrile. Labs and imaging obtained. Differential diagnosis includes arrhythmia, electrolyte abnormality, ACS.     I reviewed and interpreted labs.  CBC reassuring with no leukocytosis or anemia.  CMP showed mild elevation in creatinine at 1.6 which is mildly increased in patient's baseline creatinine 1.3 on prior labs.  Electrolytes are reassuring.  Troponin is 15.  Patient's cardiac enzymes are chronically elevated, and this is not significantly changed.  No acute ischemic changes on EKG.  Patient is denying any chest pain.  ACS is not suspected.    Chest xray interpreted by me. Interpretation-lungs clear, no infiltrate. Radiologist confirms read.    Considered pulmonary embolus.  Patient is on Xarelto and compliant.  No hypoxia.  PE is not suspected at this time.    Cardiology consulted. Dr. Gale evaluated patient. Per note recommends repeat ECHO and

## 2024-11-05 NOTE — CONSULTS
INPATIENT CONSULT-[facility] CARDIOLOGY    Name: Favio Kitchen Jr.    Age: 73 y.o.    Date of Admission: 11/5/2024 10:27 AM    Date of Service: 11/5/2024    Reason for Consultation: Atrial fibrillation, uncontrolled hypertension    Referring Physician: Iftikhar CAT    History of Present Illness: The patient is a 73 y.o. year old male who was at home when he started developing throat tightness which is a symptom he \"always\" has with uncontrolled hypertension.  Checked home blood pressure which was around 190/120 and came here, where an ECG showed controlled AF with right bundle branch block.  Just had AF ablation at [facility] on October 8.    Past Medical History:  Hypertension, hyperlipidemia, stage IIIa chronic kidney disease with creatinine 1.4-1.5 and GFR 50-55, chronic right bundle branch block, remote stroke, atrial fibrillation diagnosed 2014 post cardioversion at that time and also 2016.  Ablation at [facility] as above on October 8, 2024 and currently on flecainide and Xarelto anticoagulation with last echocardiogram 2020 showing preserved ejection fraction and normal valvular function.    Review of Systems:     Constitutional: No fever, chills, sweats  Cardiac: As per HPI  Pulmonary: As per HPI  HEENT: No visual disturbances or difficult swallowing  GI: No nausea, vomiting, diarrhea, abdominal pain, rectal bleeding  : No dysuria or hematuria  Endocrine: No excessive thirst, heat or cold intolerance.   Musculoskeletal: Joint pain but no muscle aches. No claudication  Skin: No skin breakdown or rashes  Neuro: No headache, confusion, or seizures  Psych: No depression, anxiety      Family History:  Family History   Problem Relation Age of Onset    Other Mother         poor circulation     Colon Cancer Father        Social History:  Social History     Socioeconomic History    Marital status:      Spouse name: Not on file    Number of children: Not on file    Years of education: Not on file  150/110   Pulse 84   Temp 97.9 °F (36.6 °C) (Oral)   Resp 14   Ht 1.727 m (5' 8\")   Wt 87.5 kg (193 lb)   SpO2 100%   BMI 29.35 kg/m²   Weight change:   Wt Readings from Last 3 Encounters:   11/05/24 87.5 kg (193 lb)   10/23/24 88.5 kg (195 lb)   03/10/21 88.5 kg (195 lb)         General: Awake, alert, oriented x3, no acute distress  HEENT: Unremarkable  Neck: No JVD or bruits.  Cardiac: Regular rate and rhythm, normal S1 and S2, no extra heart sounds, murmurs, heaves, thrills  Resp: Lungs clear without wheezing or crackles. No accessory muscle use or retraction  Abdomen: soft, nontender, nondistended, no gross organomegaly or mass  Skin: Warm and dry, no cyanosis.  Musculoskeletal: No identified joint deformity  Neuro: Grossly unremarkable  Psych: Cooperative, and normal affect    Intake/Output:  No intake or output data in the 24 hours ending 11/05/24 1243  No intake/output data recorded.    Laboratory Tests:  Lab Results   Component Value Date    CREATININE 1.6 (H) 11/05/2024    BUN 19 11/05/2024     11/05/2024    K 4.7 11/05/2024     11/05/2024    CO2 28 11/05/2024     No results for input(s): \"CKTOTAL\", \"CKMB\" in the last 72 hours.    Invalid input(s): \"TROPONONI\"  No results found for: \"BNP\"  Lab Results   Component Value Date/Time    WBC 5.8 11/05/2024 11:26 AM    RBC 5.28 11/05/2024 11:26 AM    HGB 15.7 11/05/2024 11:26 AM    HCT 47.4 11/05/2024 11:26 AM    MCV 89.8 11/05/2024 11:26 AM    MCH 29.7 11/05/2024 11:26 AM    MCHC 33.1 11/05/2024 11:26 AM    RDW 12.9 11/05/2024 11:26 AM     11/05/2024 11:26 AM    MPV 11.2 11/05/2024 11:26 AM     Recent Labs     11/05/24  1126   ALKPHOS 97   ALT 15   AST 20   BILITOT 0.8     Lab Results   Component Value Date/Time    MG 2.2 11/05/2024 11:26 AM     Lab Results   Component Value Date/Time    PROTIME 12.3 06/22/2020 09:01 AM    INR 1.1 06/22/2020 09:01 AM     Lab Results   Component Value Date/Time    TSH 2.96 10/24/2024 05:19 AM     No

## 2024-11-05 NOTE — PROGRESS NOTES
4 Eyes Skin Assessment     NAME:  Favio Kitchen Jr.  YOB: 1950  MEDICAL RECORD NUMBER:  [id number]    The patient is being assessed for  Admission    I agree that at least one RN has performed a thorough Head to Toe Skin Assessment on the patient. ALL assessment sites listed below have been assessed.      Areas assessed by both nurses:    Head, Face, Ears, Shoulders, Back, Chest, Arms, Elbows, Hands, Sacrum. Buttock, Coccyx, Ischium, Legs. Feet and Heels, and Under Medical Devices         Does the Patient have a Wound? No noted wound(s)       Orion Prevention initiated by RN: No  Wound Care Orders initiated by RN: No    Pressure Injury (Stage 3,4, Unstageable, DTI, NWPT, and Complex wounds) if present, place Wound referral order by RN under : No    New Ostomies, if present place, Ostomy referral order under : No     Nurse 1 eSignature: Electronically signed by Manju Hoffman RN on 11/5/24 at 4:44 PM EST    **SHARE this note so that the co-signing nurse can place an eSignature**    Nurse 2 eSignature: Electronically signed by Marian Doty RN on 11/5/24 at 4:47 PM EST

## 2024-11-05 NOTE — CARE COORDINATION
Internal Medicine On-call Care Coordination Note     I was called by the ED physician because they recommended admission for this patient and we cover their PCP.  The history as I understand it after discussion with the ED physician is as follows:     Admit patient for possible cardioversion tomorrow     I placed admission orders.  Including:     Patient comes in with A-fib with controlled response  Continue OAC     Dr. Pineda or his coverage will see the patient tomorrow for H&P and review of all orders.     Electronically signed by James Lake MD on 11/5/2024 at 3:28 PM

## 2024-11-05 NOTE — ED NOTES
ED to Inpatient Handoff Report    Notified ed that electronic handoff available and patient ready for transport to room 0447.    Safety Risks: None identified    Patient in Restraints: no    Constant Observer or Patient : no    Telemetry Monitoring Ordered :Yes      Cardiac Rhythm: Sinus rhythm, 1° AV Block, Atrial fib    Order to transfer to unit without monitor:YES    Last MEWS: 1 Time completed: 1555    Deterioration Index Score:   Predictive Model Details          21 (Normal)  Factor Value    Calculated 11/5/2024 15:56 62% Age 73 years old    Deterioration Index Model 19% Potassium 4.7 mmol/L     7% Systolic 138     7% Pulse oximetry 100 %     4% Respiratory rate 17     1% Hematocrit 47.4 %     1% Temperature 97.6 °F (36.4 °C)     0% Pulse 78     0% WBC count 5.8 k/uL     0% Sodium 139 mmol/L        Vitals:    11/05/24 1025 11/05/24 1315 11/05/24 1555   BP: (!) 150/110 (!) 149/97 138/89   Pulse: 84 77 78   Resp: 14 16 17   Temp: 97.9 °F (36.6 °C) 97.6 °F (36.4 °C) 97.6 °F (36.4 °C)   TempSrc: Oral Oral Oral   SpO2: 100% 100% 100%   Weight: 87.5 kg (193 lb)     Height: 1.727 m (5' 8\")           Opportunity for questions and clarification was provided.

## 2024-11-05 NOTE — ED PROVIDER NOTES
[facility] EMERGENCY DEPARTMENT  EMERGENCY DEPARTMENT ENCOUNTER        Pt Name: Favio Kitchen Jr.  MRN: [id number]  Birthdate 1950  Date of evaluation: 11/5/2024  Provider: Franco Brandon DO  PCP: Arash Cummings MD  Note Started: 10:48 AM EST 11/5/24    CHIEF COMPLAINT       Chief Complaint   Patient presents with    Hypertension     hypertension states was 170/112. had ablation last month and cardioversion 2 weeks ago.       HISTORY OF PRESENT ILLNESS: 1 or more Elements   History From: Patient    Limitations to history : None    Favio Kitchen Jr. is a 73 y.o. male with a past medical history of hypertension, atrial fibrillation, CKD, a flutter with RVR, cardioversion, ablation who presents to the ED with 4-day history of elevated blood pressure readings, elevated heart rate readings, intermittent headaches, shortness of breath on exertion, and a \"tightness/lump in my throat\", pointing to his upper chest, which she states he has had before when he has gotten episodes of atrial fibrillation.  Patient states he had an ablation 1 month ago at the [facility] for atrial fibrillation and then was seen here 2 weeks ago and had a cardioversion performed.  Patient states his blood pressure is usually in the 130s over 80s but today it is 170/115, stating it has been around this for the past 4 days.  He states that his heart rate is usually in the 60s but has been in the 80s over the past 4 days.  Patient states he is on Xarelto and does not miss any doses. Patient denies fever, chills, abdominal pain, nausea, vomiting, diarrhea, lightheadedness, dysuria, hematuria, hematochezia, and melena.    Nursing Notes were all reviewed and agreed with or any disagreements were addressed in the HPI.        REVIEW OF EXTERNAL NOTES :      Patient admitted last week for atrial flutter with RVR, CKD, HTN.  Patient was started back on metoprolol and flecainide and was told to follow-up

## 2024-11-06 ENCOUNTER — ANESTHESIA EVENT (OUTPATIENT)
Dept: INPATIENT UNIT | Age: 74
End: 2024-11-06
Payer: MEDICARE

## 2024-11-06 ENCOUNTER — APPOINTMENT (OUTPATIENT)
Age: 74
End: 2024-11-06
Attending: HOSPITALIST
Payer: MEDICARE

## 2024-11-06 LAB
ANION GAP SERPL CALCULATED.3IONS-SCNC: 11 MMOL/L (ref 7–16)
BASOPHILS # BLD: 0.06 K/UL (ref 0–0.2)
BASOPHILS NFR BLD: 1 % (ref 0–2)
BUN SERPL-MCNC: 15 MG/DL (ref 6–23)
CALCIUM SERPL-MCNC: 9 MG/DL (ref 8.6–10.2)
CHLORIDE SERPL-SCNC: 103 MMOL/L (ref 98–107)
CO2 SERPL-SCNC: 24 MMOL/L (ref 22–29)
CREAT SERPL-MCNC: 1.2 MG/DL (ref 0.7–1.2)
ECHO AO ASC DIAM: 3 CM
ECHO AO ASCENDING AORTA INDEX: 1.49 CM/M2
ECHO AV AREA PEAK VELOCITY: 3 CM2
ECHO AV AREA VTI: 3 CM2
ECHO AV AREA/BSA PEAK VELOCITY: 1.5 CM2/M2
ECHO AV AREA/BSA VTI: 1.5 CM2/M2
ECHO AV CUSP MM: 1.8 CM
ECHO AV MEAN GRADIENT: 2 MMHG
ECHO AV MEAN VELOCITY: 0.7 M/S
ECHO AV PEAK GRADIENT: 3 MMHG
ECHO AV PEAK VELOCITY: 0.9 M/S
ECHO AV VELOCITY RATIO: 0.89
ECHO AV VTI: 13.5 CM
ECHO BSA: 2.05 M2
ECHO EST RA PRESSURE: 3 MMHG
ECHO LA DIAMETER INDEX: 2.19 CM/M2
ECHO LA DIAMETER: 4.4 CM
ECHO LA VOL A-L A2C: 64 ML (ref 18–58)
ECHO LA VOL A-L A4C: 63 ML (ref 18–58)
ECHO LA VOL MOD A2C: 62 ML (ref 18–58)
ECHO LA VOL MOD A4C: 57 ML (ref 18–58)
ECHO LA VOLUME AREA LENGTH: 71 ML
ECHO LA VOLUME INDEX A-L A2C: 32 ML/M2 (ref 16–34)
ECHO LA VOLUME INDEX A-L A4C: 31 ML/M2 (ref 16–34)
ECHO LA VOLUME INDEX AREA LENGTH: 35 ML/M2 (ref 16–34)
ECHO LA VOLUME INDEX MOD A2C: 31 ML/M2 (ref 16–34)
ECHO LA VOLUME INDEX MOD A4C: 28 ML/M2 (ref 16–34)
ECHO LV EF PHYSICIAN: 52 %
ECHO LV FRACTIONAL SHORTENING: 14 % (ref 28–44)
ECHO LV INTERNAL DIMENSION DIASTOLE INDEX: 1.44 CM/M2
ECHO LV INTERNAL DIMENSION DIASTOLIC: 2.9 CM (ref 4.2–5.9)
ECHO LV INTERNAL DIMENSION SYSTOLIC INDEX: 1.24 CM/M2
ECHO LV INTERNAL DIMENSION SYSTOLIC: 2.5 CM
ECHO LV IVSD: 2.3 CM (ref 0.6–1)
ECHO LV IVSS: 1.4 CM
ECHO LV MASS 2D: 439 G (ref 88–224)
ECHO LV MASS INDEX 2D: 218.4 G/M2 (ref 49–115)
ECHO LV POSTERIOR WALL DIASTOLIC: 3 CM (ref 0.6–1)
ECHO LV POSTERIOR WALL SYSTOLIC: 1.4 CM
ECHO LV RELATIVE WALL THICKNESS RATIO: 2.07
ECHO LVOT AREA: 3.5 CM2
ECHO LVOT AV VTI INDEX: 0.9
ECHO LVOT DIAM: 2.1 CM
ECHO LVOT MEAN GRADIENT: 1 MMHG
ECHO LVOT PEAK GRADIENT: 3 MMHG
ECHO LVOT PEAK VELOCITY: 0.8 M/S
ECHO LVOT STROKE VOLUME INDEX: 20.8 ML/M2
ECHO LVOT SV: 41.9 ML
ECHO LVOT VTI: 12.1 CM
ECHO MV AREA PHT: 5 CM2
ECHO MV AREA VTI: 2.7 CM2
ECHO MV E DECELERATION TIME (DT): 110.9 MS
ECHO MV LVOT VTI INDEX: 1.27
ECHO MV MAX VELOCITY: 1 M/S
ECHO MV MEAN GRADIENT: 2 MMHG
ECHO MV MEAN VELOCITY: 0.7 M/S
ECHO MV PEAK GRADIENT: 4 MMHG
ECHO MV PRESSURE HALF TIME (PHT): 43.9 MS
ECHO MV VTI: 15.4 CM
ECHO PV MAX VELOCITY: 0.8 M/S
ECHO PV MEAN GRADIENT: 2 MMHG
ECHO PV MEAN VELOCITY: 0.6 M/S
ECHO PV PEAK GRADIENT: 3 MMHG
ECHO PV VTI: 11.9 CM
ECHO RV INTERNAL DIMENSION: 3.3 CM
ECHO RV TAPSE: 1.9 CM (ref 1.7–?)
EOSINOPHIL # BLD: 0.21 K/UL (ref 0.05–0.5)
EOSINOPHILS RELATIVE PERCENT: 3 % (ref 0–6)
ERYTHROCYTE [DISTWIDTH] IN BLOOD BY AUTOMATED COUNT: 12.8 % (ref 11.5–15)
GFR, ESTIMATED: 67 ML/MIN/1.73M2
GLUCOSE SERPL-MCNC: 85 MG/DL (ref 74–99)
HCT VFR BLD AUTO: 44.4 % (ref 37–54)
HGB BLD-MCNC: 14.8 G/DL (ref 12.5–16.5)
IMM GRANULOCYTES # BLD AUTO: 0.04 K/UL (ref 0–0.58)
IMM GRANULOCYTES NFR BLD: 1 % (ref 0–5)
LYMPHOCYTES NFR BLD: 1.88 K/UL (ref 1.5–4)
LYMPHOCYTES RELATIVE PERCENT: 30 % (ref 20–42)
MCH RBC QN AUTO: 29.7 PG (ref 26–35)
MCHC RBC AUTO-ENTMCNC: 33.3 G/DL (ref 32–34.5)
MCV RBC AUTO: 89 FL (ref 80–99.9)
MONOCYTES NFR BLD: 0.76 K/UL (ref 0.1–0.95)
MONOCYTES NFR BLD: 12 % (ref 2–12)
NEUTROPHILS NFR BLD: 54 % (ref 43–80)
NEUTS SEG NFR BLD: 3.37 K/UL (ref 1.8–7.3)
PLATELET # BLD AUTO: 255 K/UL (ref 130–450)
PMV BLD AUTO: 11.6 FL (ref 7–12)
POTASSIUM SERPL-SCNC: 4.1 MMOL/L (ref 3.5–5)
RBC # BLD AUTO: 4.99 M/UL (ref 3.8–5.8)
SODIUM SERPL-SCNC: 138 MMOL/L (ref 132–146)
TSH SERPL DL<=0.05 MIU/L-ACNC: 2.82 UIU/ML (ref 0.27–4.2)
WBC OTHER # BLD: 6.3 K/UL (ref 4.5–11.5)

## 2024-11-06 PROCEDURE — 6370000000 HC RX 637 (ALT 250 FOR IP): Performed by: HOSPITALIST

## 2024-11-06 PROCEDURE — 93306 TTE W/DOPPLER COMPLETE: CPT

## 2024-11-06 PROCEDURE — G0378 HOSPITAL OBSERVATION PER HR: HCPCS

## 2024-11-06 PROCEDURE — 2580000003 HC RX 258: Performed by: HOSPITALIST

## 2024-11-06 PROCEDURE — 84443 ASSAY THYROID STIM HORMONE: CPT

## 2024-11-06 PROCEDURE — 80048 BASIC METABOLIC PNL TOTAL CA: CPT

## 2024-11-06 PROCEDURE — 6370000000 HC RX 637 (ALT 250 FOR IP): Performed by: INTERNAL MEDICINE

## 2024-11-06 PROCEDURE — 85025 COMPLETE CBC W/AUTO DIFF WBC: CPT

## 2024-11-06 RX ADMIN — LISINOPRIL 10 MG: 10 TABLET ORAL at 08:02

## 2024-11-06 RX ADMIN — LEVOTHYROXINE SODIUM 200 MCG: 100 TABLET ORAL at 13:00

## 2024-11-06 RX ADMIN — SODIUM CHLORIDE, PRESERVATIVE FREE 10 ML: 5 INJECTION INTRAVENOUS at 20:23

## 2024-11-06 RX ADMIN — Medication 3 MG: at 03:10

## 2024-11-06 RX ADMIN — SODIUM CHLORIDE, PRESERVATIVE FREE 10 ML: 5 INJECTION INTRAVENOUS at 08:02

## 2024-11-06 RX ADMIN — METOPROLOL SUCCINATE 50 MG: 50 TABLET, EXTENDED RELEASE ORAL at 08:02

## 2024-11-06 RX ADMIN — RIVAROXABAN 20 MG: 20 TABLET, FILM COATED ORAL at 08:02

## 2024-11-06 RX ADMIN — AMLODIPINE BESYLATE 10 MG: 10 TABLET ORAL at 08:02

## 2024-11-06 RX ADMIN — FLECAINIDE ACETATE 100 MG: 100 TABLET ORAL at 08:02

## 2024-11-06 RX ADMIN — METOPROLOL SUCCINATE 50 MG: 50 TABLET, EXTENDED RELEASE ORAL at 20:22

## 2024-11-06 RX ADMIN — ROSUVASTATIN CALCIUM 10 MG: 10 TABLET, FILM COATED ORAL at 08:02

## 2024-11-06 RX ADMIN — FLECAINIDE ACETATE 100 MG: 100 TABLET ORAL at 20:22

## 2024-11-06 NOTE — PLAN OF CARE
Problem: Discharge Planning  Goal: Discharge to home or other facility with appropriate resources  11/6/2024 1551 by Manju Hoffman, RN  Outcome: Progressing     Problem: ABCDS Injury Assessment  Goal: Absence of physical injury  11/6/2024 1551 by Manju Hoffman, RN  Outcome: Progressing

## 2024-11-06 NOTE — CARE COORDINATION
CASE MANAGEMENT.... Patient is a readmission with same chronic issue of Afib. Cardio on board. Echo ordered and he is for possible CV. Met with Favio at the bedside. He follows with [facility] cardio and also at the [facility] where he had his ablation 2 weeks ago. Is independent from home with his wife. PCP is Dr Cummings-has appt scheduled with him next week. He was on xarelto, flecainide and toprol xl prior to arrival.  Discharge plan is home. No needs noted. He is hopeful for dc later today/tomorrow. Will follow.

## 2024-11-06 NOTE — H&P
Internal Medicine History & Physical     Name: Favio Kitchen Jr.  : 1950  Chief Complaint: Hypertension (hypertension states was 170/112. had ablation last month and cardioversion 2 weeks ago.)  Primary Care Physician: Arash Cummings MD  Admission date: 2024  Date of service: 2024   Unit: [address]    History of Present Illness  Favio is a 73 y.o. year old male with a past medical history of hypertension, CKD, A-fib/a flutter with RVR, status post cardiac ablation 1 month ago, and cardioversion who presented to the ER secondary to elevated blood pressures of 170s over 110s for the past several days.  He is noted to have had an ablation 1 month ago at [facility] and was also cardioverted 2 weeks ago at this facility.  Patient had spoken to his cardiologist prior to arrival and was instructed at that time to present to the ER for evaluation and admission for repeat echo and possible cardioversion due to atrial fibrillation.  In the ER he was found to be in sinus rhythm with first-degree AV block and some premature supraventricular complexes.  Delta troponin negative.  Creatinine of 1.6 with a baseline around 1.1.  Patient was given 1 dose of Norvasc in the ER.    This morning patient was initial receiving his echocardiogram.  On repeat attempt, he is resting comfortably in his bed.  States that he had one episode this morning where he felt a palpitation but otherwise is feeling well.  He states he has been up to walk around since his arrival.  Denies any CP, nausea, vomiting, fevers, chills, abdominal pain, or urinary symptoms.  Cardiology also evaluated patient and states that patient continues to be in afib with plan for cardioversion tomorrow.      There are no family or friends at bedside. The history is provided by patient. Patient is felt to be a good historian.    ED course:   Initial blood work and imaging studies performed. Admission recommended by ED physician. My coverage

## 2024-11-06 NOTE — ACP (ADVANCE CARE PLANNING)
Advance Care Planning   Healthcare Decision Maker:    Primary Decision Maker: Sona Kitchen - [facility] - [phone]

## 2024-11-06 NOTE — PROGRESS NOTES
DAILY PROGRESS NOTE -[facility] CARDIOLOGY    SUBJECTIVE:    Persistent symptomatic atrial fibrillation after ablation early October 2024 at [facility].  Feeling fairly well this morning but does have notation of intermittent uncomfortable palpitations.    Hypertension, hyperlipidemia, stage IIIa chronic kidney disease with GFR 50 to 55%, remote stroke, AF diagnosed remotely post [facility] ablation October 2024 with preserved EF and normal valves on remote echo 2020.    OBJECTIVE:    His vital signs were reviewed today.    Vitals:    11/06/24 1045   BP: 122/76   Pulse: 73   Resp: 18   Temp: 98.3 °F (36.8 °C)   SpO2: 97%       Scheduled Meds:   amLODIPine  10 mg Oral Daily    rosuvastatin  10 mg Oral QAM    flecainide  100 mg Oral BID    rivaroxaban  20 mg Oral Daily    metoprolol succinate  50 mg Oral BID    levothyroxine  200 mcg Oral Lunch    lisinopril  10 mg Oral Daily    sodium chloride flush  5-40 mL IntraVENous 2 times per day     Continuous Infusions:   sodium chloride       PRN Meds:.cetirizine, sodium chloride flush, sodium chloride, potassium chloride **OR** potassium alternative oral replacement **OR** [DISCONTINUED] potassium chloride, magnesium sulfate, ondansetron **OR** ondansetron, polyethylene glycol, acetaminophen **OR** acetaminophen, melatonin    REVIEW OF SYSTEMS:     No pruritus, rash, bruising.  Cardiac and pulmonary symptoms per HPI.  No nausea, vomiting, abdominal pain, GI bleeding, change in bowel habits.  No dysuria, urinary frequency, urgency, hematuria, flank pain.  Joint pain but no muscle soreness, stiffness, aching.  No headache, speech disturbance, lateralizing neurologic deficit.  No hemoptysis, epistaxis, easy bruising.  No anxiety, depression.  No symptoms of hypothyroidism, hyperthyroidism, diabetes, heat or cold intolerance.    FAMILY HISTORY: Negative for CAD in first-degree relatives.    SOCIAL HISTORY: Negative for alcohol, tobacco, or illicit drug

## 2024-11-06 NOTE — PLAN OF CARE
Problem: Discharge Planning  Goal: Discharge to home or other facility with appropriate resources  11/6/2024 0403 by Joana Gustafson, RN  Outcome: Progressing     Problem: ABCDS Injury Assessment  Goal: Absence of physical injury  11/6/2024 0403 by Joana Gustafson, RN  Outcome: Progressing

## 2024-11-07 ENCOUNTER — ANESTHESIA (OUTPATIENT)
Dept: INPATIENT UNIT | Age: 74
End: 2024-11-07
Payer: MEDICARE

## 2024-11-07 ENCOUNTER — HOSPITAL ENCOUNTER (OUTPATIENT)
Dept: INPATIENT UNIT | Age: 74
Setting detail: OBSERVATION
Discharge: HOME | End: 2024-11-07
Payer: MEDICARE

## 2024-11-07 VITALS
WEIGHT: 194.7 LBS | HEART RATE: 60 BPM | DIASTOLIC BLOOD PRESSURE: 93 MMHG | BODY MASS INDEX: 29.51 KG/M2 | RESPIRATION RATE: 18 BRPM | SYSTOLIC BLOOD PRESSURE: 134 MMHG | OXYGEN SATURATION: 98 % | TEMPERATURE: 97.8 F | HEIGHT: 68 IN

## 2024-11-07 VITALS
TEMPERATURE: 98 F | RESPIRATION RATE: 20 BRPM | HEART RATE: 54 BPM | OXYGEN SATURATION: 97 % | SYSTOLIC BLOOD PRESSURE: 155 MMHG | DIASTOLIC BLOOD PRESSURE: 79 MMHG

## 2024-11-07 PROCEDURE — 92960 CARDIOVERSION ELECTRIC EXT: CPT | Performed by: ANESTHESIOLOGY

## 2024-11-07 PROCEDURE — 2580000003 HC RX 258: Performed by: NURSE ANESTHETIST, CERTIFIED REGISTERED

## 2024-11-07 PROCEDURE — G0378 HOSPITAL OBSERVATION PER HR: HCPCS

## 2024-11-07 PROCEDURE — 7100000010 HC PHASE II RECOVERY - FIRST 15 MIN: Performed by: ANESTHESIOLOGY

## 2024-11-07 PROCEDURE — 6370000000 HC RX 637 (ALT 250 FOR IP): Performed by: HOSPITALIST

## 2024-11-07 PROCEDURE — 3700000000 HC ANESTHESIA ATTENDED CARE: Performed by: ANESTHESIOLOGY

## 2024-11-07 PROCEDURE — 6360000002 HC RX W HCPCS: Performed by: NURSE ANESTHETIST, CERTIFIED REGISTERED

## 2024-11-07 PROCEDURE — 6360000002 HC RX W HCPCS

## 2024-11-07 PROCEDURE — 2580000003 HC RX 258: Performed by: HOSPITALIST

## 2024-11-07 PROCEDURE — 7100000011 HC PHASE II RECOVERY - ADDTL 15 MIN: Performed by: ANESTHESIOLOGY

## 2024-11-07 PROCEDURE — 3700000001 HC ADD 15 MINUTES (ANESTHESIA): Performed by: ANESTHESIOLOGY

## 2024-11-07 RX ORDER — SODIUM CHLORIDE 0.9 % (FLUSH) 0.9 %
5-40 SYRINGE (ML) INJECTION EVERY 12 HOURS SCHEDULED
Status: DISCONTINUED | OUTPATIENT
Start: 2024-11-07 | End: 2024-11-08 | Stop reason: HOSPADM

## 2024-11-07 RX ORDER — NALOXONE HYDROCHLORIDE 0.4 MG/ML
INJECTION, SOLUTION INTRAMUSCULAR; INTRAVENOUS; SUBCUTANEOUS PRN
Status: DISCONTINUED | OUTPATIENT
Start: 2024-11-07 | End: 2024-11-08 | Stop reason: HOSPADM

## 2024-11-07 RX ORDER — IPRATROPIUM BROMIDE AND ALBUTEROL SULFATE 2.5; .5 MG/3ML; MG/3ML
1 SOLUTION RESPIRATORY (INHALATION)
Status: DISCONTINUED | OUTPATIENT
Start: 2024-11-07 | End: 2024-11-08 | Stop reason: HOSPADM

## 2024-11-07 RX ORDER — SODIUM CHLORIDE 9 MG/ML
INJECTION, SOLUTION INTRAVENOUS PRN
Status: DISCONTINUED | OUTPATIENT
Start: 2024-11-07 | End: 2024-11-08 | Stop reason: HOSPADM

## 2024-11-07 RX ORDER — DEXTROSE MONOHYDRATE 100 MG/ML
INJECTION, SOLUTION INTRAVENOUS CONTINUOUS PRN
Status: DISCONTINUED | OUTPATIENT
Start: 2024-11-07 | End: 2024-11-08 | Stop reason: HOSPADM

## 2024-11-07 RX ORDER — SODIUM CHLORIDE 9 MG/ML
INJECTION, SOLUTION INTRAVENOUS PRN
Status: CANCELLED | OUTPATIENT
Start: 2024-11-07

## 2024-11-07 RX ORDER — SODIUM CHLORIDE 0.9 % (FLUSH) 0.9 %
5-40 SYRINGE (ML) INJECTION EVERY 12 HOURS SCHEDULED
Status: CANCELLED | OUTPATIENT
Start: 2024-11-07

## 2024-11-07 RX ORDER — SODIUM CHLORIDE 0.9 % (FLUSH) 0.9 %
5-40 SYRINGE (ML) INJECTION PRN
Status: DISCONTINUED | OUTPATIENT
Start: 2024-11-07 | End: 2024-11-08 | Stop reason: HOSPADM

## 2024-11-07 RX ORDER — AMLODIPINE BESYLATE 10 MG/1
10 TABLET ORAL DAILY
Qty: 30 TABLET | Refills: 0 | Status: SHIPPED | OUTPATIENT
Start: 2024-11-08

## 2024-11-07 RX ORDER — ONDANSETRON 2 MG/ML
4 INJECTION INTRAMUSCULAR; INTRAVENOUS
Status: DISCONTINUED | OUTPATIENT
Start: 2024-11-07 | End: 2024-11-07 | Stop reason: SDUPTHER

## 2024-11-07 RX ORDER — SODIUM CHLORIDE 0.9 % (FLUSH) 0.9 %
5-40 SYRINGE (ML) INJECTION PRN
Status: CANCELLED | OUTPATIENT
Start: 2024-11-07

## 2024-11-07 RX ORDER — ONDANSETRON 2 MG/ML
4 INJECTION INTRAMUSCULAR; INTRAVENOUS
Status: DISCONTINUED | OUTPATIENT
Start: 2024-11-07 | End: 2024-11-08 | Stop reason: HOSPADM

## 2024-11-07 RX ORDER — GLUCAGON 1 MG/ML
1 KIT INJECTION PRN
Status: DISCONTINUED | OUTPATIENT
Start: 2024-11-07 | End: 2024-11-08 | Stop reason: HOSPADM

## 2024-11-07 RX ORDER — SODIUM CHLORIDE 9 MG/ML
INJECTION, SOLUTION INTRAVENOUS
Status: DISCONTINUED | OUTPATIENT
Start: 2024-11-07 | End: 2024-11-07 | Stop reason: SDUPTHER

## 2024-11-07 RX ORDER — PROPOFOL 10 MG/ML
INJECTION, EMULSION INTRAVENOUS
Status: DISCONTINUED | OUTPATIENT
Start: 2024-11-07 | End: 2024-11-07 | Stop reason: SDUPTHER

## 2024-11-07 RX ADMIN — SODIUM CHLORIDE, PRESERVATIVE FREE 10 ML: 5 INJECTION INTRAVENOUS at 09:53

## 2024-11-07 RX ADMIN — ROSUVASTATIN CALCIUM 10 MG: 10 TABLET, FILM COATED ORAL at 09:49

## 2024-11-07 RX ADMIN — SODIUM CHLORIDE: 9 INJECTION, SOLUTION INTRAVENOUS at 07:00

## 2024-11-07 RX ADMIN — AMLODIPINE BESYLATE 10 MG: 10 TABLET ORAL at 06:21

## 2024-11-07 RX ADMIN — RIVAROXABAN 20 MG: 20 TABLET, FILM COATED ORAL at 06:21

## 2024-11-07 RX ADMIN — LEVOTHYROXINE SODIUM 200 MCG: 100 TABLET ORAL at 09:49

## 2024-11-07 RX ADMIN — METOPROLOL SUCCINATE 50 MG: 50 TABLET, EXTENDED RELEASE ORAL at 06:20

## 2024-11-07 RX ADMIN — FLECAINIDE ACETATE 100 MG: 100 TABLET ORAL at 09:50

## 2024-11-07 RX ADMIN — LISINOPRIL 10 MG: 10 TABLET ORAL at 09:49

## 2024-11-07 RX ADMIN — PROPOFOL 70 MG: 10 INJECTION, EMULSION INTRAVENOUS at 07:49

## 2024-11-07 ASSESSMENT — PAIN - FUNCTIONAL ASSESSMENT
PAIN_FUNCTIONAL_ASSESSMENT: 0-10
PAIN_FUNCTIONAL_ASSESSMENT: 0-10

## 2024-11-07 ASSESSMENT — ENCOUNTER SYMPTOMS: DYSPNEA ACTIVITY LEVEL: NO INTERVAL CHANGE

## 2024-11-07 NOTE — ANESTHESIA POSTPROCEDURE EVALUATION
Department of Anesthesiology  Postprocedure Note    Patient: Favio Kitchen Jr.  MRN: [id number]  YOB: 1950  Date of evaluation: 11/7/2024    Procedure Summary       Date: 11/07/24 Room / Location: [facility] Stage I    Anesthesia Start: 0710 Anesthesia Stop: 0757    Procedure: CARDIOVERSION Diagnosis:     Scheduled Providers:  Responsible Provider: Guillermo Taylor DO    Anesthesia Type: MAC ASA Status: 3            Anesthesia Type: No value filed.    Yolanda Phase I:      Yolanda Phase II:      Anesthesia Post Evaluation    Patient location during evaluation: PACU  Patient participation: complete - patient participated  Level of consciousness: awake and alert  Pain score: 0  Airway patency: patent  Nausea & Vomiting: no nausea and no vomiting  Cardiovascular status: blood pressure returned to baseline  Respiratory status: acceptable  Hydration status: euvolemic  Pain management: adequate and satisfactory to patient        No notable events documented.

## 2024-11-07 NOTE — PROGRESS NOTES
DAILY PROGRESS NOTE -[facility] CARDIOLOGY    SUBJECTIVE:    Seen in the CV suite and has no complaints.  Remains in controlled atrial flutter.  Blood pressure was elevated at 178/90.  No chest pain, worsening dyspnea, palpitations, syncope, presyncope.    Hypertension, hyperlipidemia, stage IIIa chronic kidney disease with GFR 50 to 55%, remote stroke, AF diagnosed remotely post [facility] ablation October 2024 with preserved EF and normal valves on remote echo 2020.    OBJECTIVE:    His vital signs were reviewed today.    Vitals:    11/07/24 0615   BP: (!) 139/110   Pulse: 86   Resp:    Temp:    SpO2:        Scheduled Meds:   amLODIPine  10 mg Oral Daily    rosuvastatin  10 mg Oral QAM    flecainide  100 mg Oral BID    rivaroxaban  20 mg Oral Daily    metoprolol succinate  50 mg Oral BID    levothyroxine  200 mcg Oral Lunch    lisinopril  10 mg Oral Daily    sodium chloride flush  5-40 mL IntraVENous 2 times per day     Continuous Infusions:   sodium chloride       PRN Meds:.cetirizine, sodium chloride flush, sodium chloride, potassium chloride **OR** potassium alternative oral replacement **OR** [DISCONTINUED] potassium chloride, magnesium sulfate, ondansetron **OR** ondansetron, polyethylene glycol, acetaminophen **OR** acetaminophen, melatonin    REVIEW OF SYSTEMS:     No pruritus, rash, bruising.  Cardiac and pulmonary symptoms per HPI.  No nausea, vomiting, abdominal pain, GI bleeding, change in bowel habits.  No dysuria, urinary frequency, urgency, hematuria, flank pain.  Joint pain but no muscle soreness, stiffness, aching.  No headache, speech disturbance, lateralizing neurologic deficit.  No hemoptysis, epistaxis, easy bruising.  No anxiety, depression.  No symptoms of hypothyroidism, hyperthyroidism, diabetes, heat or cold intolerance.    FAMILY HISTORY: Negative for CAD in first-degree relatives.    SOCIAL HISTORY: Negative for alcohol, tobacco, or illicit drug use.      PHYSICAL EXAM:    General

## 2024-11-07 NOTE — PROGRESS NOTES
Called and spoke with patient wife, Sona. Updated her on time of cardioversion in AM at 0730 with Dr. Gale.

## 2024-11-07 NOTE — ANESTHESIA PRE PROCEDURE
Dr. Aleman   • Environmental allergies    • Hypertension        Past Surgical History:        Procedure Laterality Date   • CARDIOVERSION  12/01/2014    x2   • TONSILLECTOMY         Social History:    Social History     Tobacco Use   • Smoking status: Never   • Smokeless tobacco: Never   Substance Use Topics   • Alcohol use: Yes     Comment: social only                                Counseling given: Not Answered      Vital Signs (Current):   There were no vitals filed for this visit.                                             BP Readings from Last 3 Encounters:   11/07/24 (!) 139/110   10/24/24 138/82   03/10/21 (!) 153/84       NPO Status:                           >8 hours                                                      BMI:   Wt Readings from Last 3 Encounters:   11/07/24 88.3 kg (194 lb 11.2 oz)   10/23/24 88.5 kg (195 lb)   03/10/21 88.5 kg (195 lb)     There is no height or weight on file to calculate BMI.    CBC:   Lab Results   Component Value Date/Time    WBC 6.3 11/06/2024 02:18 AM    RBC 4.99 11/06/2024 02:18 AM    HGB 14.8 11/06/2024 02:18 AM    HCT 44.4 11/06/2024 02:18 AM    MCV 89.0 11/06/2024 02:18 AM    RDW 12.8 11/06/2024 02:18 AM     11/06/2024 02:18 AM       CMP:   Lab Results   Component Value Date/Time     11/06/2024 02:18 AM    K 4.1 11/06/2024 02:18 AM    K 4.2 06/22/2020 09:01 AM     11/06/2024 02:18 AM    CO2 24 11/06/2024 02:18 AM    BUN 15 11/06/2024 02:18 AM    CREATININE 1.2 11/06/2024 02:18 AM    GFRAA >60 03/10/2021 07:37 PM    LABGLOM 67 11/06/2024 02:18 AM    GLUCOSE 85 11/06/2024 02:18 AM    CALCIUM 9.0 11/06/2024 02:18 AM    BILITOT 0.8 11/05/2024 11:26 AM    ALKPHOS 97 11/05/2024 11:26 AM    AST 20 11/05/2024 11:26 AM    ALT 15 11/05/2024 11:26 AM       POC Tests: No results for input(s): \"POCGLU\", \"POCNA\", \"POCK\", \"POCCL\", \"POCBUN\", \"POCHEMO\", \"POCHCT\" in the last 72 hours.    Coags:   Lab Results   Component Value Date/Time    PROTIME 12.3 06/22/2020

## 2024-11-07 NOTE — OP NOTE
Operative note    Date of Procedure: 11/7/2024    Pre-Op Diagnosis: *Persistent atrial fibrillation/flutter    Post-Op Diagnosis: Same       *Procedure: Direct current cardioversion*    *Physician: Dr. Gale    Assistant:   None    Anesthesia: *IV propofol*    Estimated Blood Loss (mL): 0    Complications: None    Specimens:   None    Implants:  None      Drains: None    Detailed Description of Procedure:  The patient arrived at the cardiac suite in a fasting and nonsedated state and received IV propofol under anesthesia supervision. Once adequate anesthesia was achieved, the patient received a single 200 J anteroposterior shock, which converted atrial fibrillation to sinus bradycardia with rate 48 bpm after an approximate 5-second asystolic pause.  A preprocedure ECG showed atrial fibrillation with a controlled rate and a post procedure ECG showed sinus bradycardia at 48 bpm with diffuse nonspecific ST-T wave abnormalities.  The patient awoke uneventfully and was sent back up to his room in no distress.  There were no immediate complications.

## 2024-11-07 NOTE — PLAN OF CARE
He was successfully cardioverted to sinus bradycardia after around a 5-second asystolic pause directly post cardioversion.  Heart rate was 48 bpm after conversion but when he was conversing it was 50-55.  Ambulate on the telemetry floor today and check blood pressure.  He was hypertensive prior to his cardioversion.  If blood pressure is controlled and he remains in sinus rhythm he can be discharged today.  I will arrange prompt follow-up in 4-6 weeks with Dr. Aleman.  Do not notify me unless heart rate goes below 30 or there are more than 5 second pauses.

## 2024-11-07 NOTE — PLAN OF CARE
Problem: Discharge Planning  Goal: Discharge to home or other facility with appropriate resources  Outcome: Adequate for Discharge     Problem: ABCDS Injury Assessment  Goal: Absence of physical injury  Outcome: Adequate for Discharge

## 2024-11-07 NOTE — DISCHARGE SUMMARY
Internal Medicine Discharge Summary    NAME: Favio Kitchen Jr. :  1950  MRN:  [id number] PCP:Arash Cummings MD    ADMITTED: 2024   DISCHARGED: 2024  1:48 PM    ADMITTING PHYSICIAN: Ramo Pineda DO    PCP: Arash Cummings MD    CONSULTANT(S):   IP CONSULT TO CARDIOLOGY  IP CONSULT TO INTERNAL MEDICINE     ADMITTING DIAGNOSIS:   A-fib (HCC) [I48.91]  Essential hypertension [I10]  Abnormal EKG [R94.31]  Persistent atrial fibrillation (HCC) [I48.19]  Atrial fibrillation with normal ventricular rate (HCC) [I48.91]     Please see H&P for further details    DISCHARGE DIAGNOSES:   Active Hospital Problems    Diagnosis     A-fib (HCC) [I48.91]      Priority: High    Chronic kidney disease, stage II (mild) [N18.2]     HTN (hypertension) [I10]        BRIEF HISTORY OF PRESENT ILLNESS: Favio Kitchen Jr. is a 73 y.o. male patient of Arash Cummings MD who  has a past medical history of A-fib (HCC), Environmental allergies, and Hypertension. who originally had concerns including Hypertension (hypertension states was 170/112. had ablation last month and cardioversion 2 weeks ago.). at presentation on 2024, and was found to have A-fib (HCC) [I48.91]  Essential hypertension [I10]  Abnormal EKG [R94.31]  Persistent atrial fibrillation (HCC) [I48.19]  Atrial fibrillation with normal ventricular rate (HCC) [I48.91] after workup.    Please see H&P for further details.    HOSPITAL COURSE:   The patient presented to the hospital with the chief complaint of Hypertension (hypertension states was 170/112. had ablation last month and cardioversion 2 weeks ago.)  . The patient was admitted to the hospital.     Recurrent Atrial Fibrillation with Secondary Hypercoagulable State  TSH stable  S/p DCCV  -- currently back in sinus rhythm/bradycardia  Continue Flecainide and Toprol XL  Continue Xarelto for anticoagulation  Monitor telemetry  Cardiology consult appreciated    Essential Hypertension  Continue Norvasc and

## 2024-11-07 NOTE — PROGRESS NOTES
Spoke w/ Dr. Gale regarding patient's vital signs post cardioversion. Stated patient is okay for discharge from cardiology standpoint. Dr. Pineda notified.

## 2024-11-07 NOTE — CARE COORDINATION
CASE MANAGEMENT.... Successful Cardioversion this am. Will discharge later today as long as patient remains in SR and Bp stable. Mr Kitchen is independent. He will return home with no needs.

## 2024-11-09 LAB
EKG ATRIAL RATE: 48 BPM
EKG P AXIS: 85 DEGREES
EKG P-R INTERVAL: 226 MS
EKG Q-T INTERVAL: 502 MS
EKG QRS DURATION: 122 MS
EKG QTC CALCULATION (BAZETT): 448 MS
EKG R AXIS: -78 DEGREES
EKG T AXIS: -87 DEGREES
EKG VENTRICULAR RATE: 48 BPM